# Patient Record
Sex: FEMALE | Race: BLACK OR AFRICAN AMERICAN | Employment: UNEMPLOYED | URBAN - METROPOLITAN AREA
[De-identification: names, ages, dates, MRNs, and addresses within clinical notes are randomized per-mention and may not be internally consistent; named-entity substitution may affect disease eponyms.]

---

## 2019-07-02 ENCOUNTER — HOSPITAL ENCOUNTER (OUTPATIENT)
Age: 49
Discharge: HOME | End: 2019-07-04
Payer: MEDICAID

## 2019-07-02 ENCOUNTER — OFFICE VISIT (OUTPATIENT)
Dept: FAMILY MEDICINE CLINIC | Age: 49
End: 2019-07-02
Payer: MEDICAID

## 2019-07-02 VITALS
BODY MASS INDEX: 44.51 KG/M2 | HEIGHT: 60 IN | SYSTOLIC BLOOD PRESSURE: 144 MMHG | WEIGHT: 226.7 LBS | RESPIRATION RATE: 16 BRPM | OXYGEN SATURATION: 98 % | TEMPERATURE: 97.8 F | DIASTOLIC BLOOD PRESSURE: 90 MMHG | HEART RATE: 87 BPM

## 2019-07-02 DIAGNOSIS — I10 ESSENTIAL HYPERTENSION: ICD-10-CM

## 2019-07-02 DIAGNOSIS — B37.2 YEAST INFECTION OF THE SKIN: ICD-10-CM

## 2019-07-02 DIAGNOSIS — E78.5 HYPERLIPIDEMIA, UNSPECIFIED HYPERLIPIDEMIA TYPE: ICD-10-CM

## 2019-07-02 DIAGNOSIS — F17.200 TOBACCO DEPENDENCY: ICD-10-CM

## 2019-07-02 DIAGNOSIS — E11.9 TYPE 2 DIABETES MELLITUS WITHOUT COMPLICATION, WITHOUT LONG-TERM CURRENT USE OF INSULIN (HCC): Primary | ICD-10-CM

## 2019-07-02 DIAGNOSIS — L02.92 BOILS: ICD-10-CM

## 2019-07-02 DIAGNOSIS — M54.31 SCIATIC NERVE PAIN, RIGHT: ICD-10-CM

## 2019-07-02 DIAGNOSIS — Z13.31 POSITIVE DEPRESSION SCREENING: ICD-10-CM

## 2019-07-02 LAB
ALBUMIN SERPL-MCNC: 4.2 G/DL (ref 3.5–5.2)
ALP BLD-CCNC: 73 U/L (ref 35–104)
ALT SERPL-CCNC: 12 U/L (ref 0–32)
ANION GAP SERPL CALCULATED.3IONS-SCNC: 18 MMOL/L (ref 7–16)
AST SERPL-CCNC: 18 U/L (ref 0–31)
BASOPHILS ABSOLUTE: 0.04 E9/L (ref 0–0.2)
BASOPHILS RELATIVE PERCENT: 0.5 % (ref 0–2)
BILIRUB SERPL-MCNC: 0.4 MG/DL (ref 0–1.2)
BUN BLDV-MCNC: 6 MG/DL (ref 6–20)
CALCIUM SERPL-MCNC: 9.8 MG/DL (ref 8.6–10.2)
CHLORIDE BLD-SCNC: 97 MMOL/L (ref 98–107)
CHOLESTEROL, TOTAL: 186 MG/DL (ref 0–199)
CO2: 23 MMOL/L (ref 22–29)
CREAT SERPL-MCNC: 0.6 MG/DL (ref 0.5–1)
CREATININE URINE POCT: 100
EOSINOPHILS ABSOLUTE: 0.25 E9/L (ref 0.05–0.5)
EOSINOPHILS RELATIVE PERCENT: 3.1 % (ref 0–6)
GFR AFRICAN AMERICAN: >60
GFR NON-AFRICAN AMERICAN: >60 ML/MIN/1.73
GLUCOSE BLD-MCNC: 243 MG/DL (ref 74–99)
HBA1C MFR BLD: 8 %
HCT VFR BLD CALC: 40.7 % (ref 34–48)
HDLC SERPL-MCNC: 60 MG/DL
HEMOGLOBIN: 13.1 G/DL (ref 11.5–15.5)
IMMATURE GRANULOCYTES #: 0.03 E9/L
IMMATURE GRANULOCYTES %: 0.4 % (ref 0–5)
LDL CHOLESTEROL CALCULATED: 107 MG/DL (ref 0–99)
LYMPHOCYTES ABSOLUTE: 2.66 E9/L (ref 1.5–4)
LYMPHOCYTES RELATIVE PERCENT: 33.1 % (ref 20–42)
MCH RBC QN AUTO: 29.4 PG (ref 26–35)
MCHC RBC AUTO-ENTMCNC: 32.2 % (ref 32–34.5)
MCV RBC AUTO: 91.3 FL (ref 80–99.9)
MICROALBUMIN/CREAT 24H UR: 10 MG/G{CREAT}
MICROALBUMIN/CREAT UR-RTO: <30
MONOCYTES ABSOLUTE: 0.4 E9/L (ref 0.1–0.95)
MONOCYTES RELATIVE PERCENT: 5 % (ref 2–12)
NEUTROPHILS ABSOLUTE: 4.66 E9/L (ref 1.8–7.3)
NEUTROPHILS RELATIVE PERCENT: 57.9 % (ref 43–80)
PDW BLD-RTO: 13.1 FL (ref 11.5–15)
PLATELET # BLD: 292 E9/L (ref 130–450)
PMV BLD AUTO: 12.1 FL (ref 7–12)
POTASSIUM SERPL-SCNC: 4.7 MMOL/L (ref 3.5–5)
RBC # BLD: 4.46 E12/L (ref 3.5–5.5)
SODIUM BLD-SCNC: 138 MMOL/L (ref 132–146)
TOTAL PROTEIN: 8.4 G/DL (ref 6.4–8.3)
TRIGL SERPL-MCNC: 97 MG/DL (ref 0–149)
TSH SERPL DL<=0.05 MIU/L-ACNC: 1.03 UIU/ML (ref 0.27–4.2)
VLDLC SERPL CALC-MCNC: 19 MG/DL
WBC # BLD: 8 E9/L (ref 4.5–11.5)

## 2019-07-02 PROCEDURE — 2022F DILAT RTA XM EVC RTNOPTHY: CPT | Performed by: FAMILY MEDICINE

## 2019-07-02 PROCEDURE — 83036 HEMOGLOBIN GLYCOSYLATED A1C: CPT | Performed by: FAMILY MEDICINE

## 2019-07-02 PROCEDURE — 85025 COMPLETE CBC W/AUTO DIFF WBC: CPT

## 2019-07-02 PROCEDURE — G8417 CALC BMI ABV UP PARAM F/U: HCPCS | Performed by: FAMILY MEDICINE

## 2019-07-02 PROCEDURE — 80061 LIPID PANEL: CPT

## 2019-07-02 PROCEDURE — 80053 COMPREHEN METABOLIC PANEL: CPT

## 2019-07-02 PROCEDURE — 3045F PR MOST RECENT HEMOGLOBIN A1C LEVEL 7.0-9.0%: CPT | Performed by: FAMILY MEDICINE

## 2019-07-02 PROCEDURE — 84443 ASSAY THYROID STIM HORMONE: CPT

## 2019-07-02 PROCEDURE — 96160 PT-FOCUSED HLTH RISK ASSMT: CPT | Performed by: FAMILY MEDICINE

## 2019-07-02 PROCEDURE — 99204 OFFICE O/P NEW MOD 45 MIN: CPT | Performed by: FAMILY MEDICINE

## 2019-07-02 PROCEDURE — G8427 DOCREV CUR MEDS BY ELIG CLIN: HCPCS | Performed by: FAMILY MEDICINE

## 2019-07-02 PROCEDURE — 4004F PT TOBACCO SCREEN RCVD TLK: CPT | Performed by: FAMILY MEDICINE

## 2019-07-02 PROCEDURE — G8431 POS CLIN DEPRES SCRN F/U DOC: HCPCS | Performed by: FAMILY MEDICINE

## 2019-07-02 PROCEDURE — 82044 UR ALBUMIN SEMIQUANTITATIVE: CPT | Performed by: FAMILY MEDICINE

## 2019-07-02 RX ORDER — LANCETS
EACH MISCELLANEOUS
Qty: 100 EACH | Refills: 3 | Status: SHIPPED | OUTPATIENT
Start: 2019-07-02 | End: 2019-08-20 | Stop reason: SDUPTHER

## 2019-07-02 RX ORDER — SERTRALINE HYDROCHLORIDE 25 MG/1
25 TABLET, FILM COATED ORAL DAILY
Qty: 30 TABLET | Refills: 3 | Status: SHIPPED | OUTPATIENT
Start: 2019-07-02 | End: 2019-08-08 | Stop reason: SDUPTHER

## 2019-07-02 RX ORDER — TIZANIDINE 2 MG/1
2 TABLET ORAL 3 TIMES DAILY PRN
Qty: 30 TABLET | Refills: 0 | Status: SHIPPED | OUTPATIENT
Start: 2019-07-02 | End: 2019-08-08 | Stop reason: SDUPTHER

## 2019-07-02 RX ORDER — BLOOD-GLUCOSE METER
EACH MISCELLANEOUS
Qty: 1 KIT | Refills: 0 | Status: SHIPPED
Start: 2019-07-02 | End: 2020-06-29

## 2019-07-02 RX ORDER — NYSTATIN 100000 [USP'U]/G
POWDER TOPICAL 3 TIMES DAILY PRN
Qty: 1 BOTTLE | Refills: 3 | Status: SHIPPED | OUTPATIENT
Start: 2019-07-02 | End: 2020-02-04 | Stop reason: SDUPTHER

## 2019-07-02 RX ORDER — GABAPENTIN 300 MG/1
300 CAPSULE ORAL 3 TIMES DAILY
Refills: 0 | COMMUNITY
Start: 2019-06-06 | End: 2020-08-25 | Stop reason: SDUPTHER

## 2019-07-02 ASSESSMENT — PATIENT HEALTH QUESTIONNAIRE - PHQ9
2. FEELING DOWN, DEPRESSED OR HOPELESS: 3
6. FEELING BAD ABOUT YOURSELF - OR THAT YOU ARE A FAILURE OR HAVE LET YOURSELF OR YOUR FAMILY DOWN: 1
9. THOUGHTS THAT YOU WOULD BE BETTER OFF DEAD, OR OF HURTING YOURSELF: 0
7. TROUBLE CONCENTRATING ON THINGS, SUCH AS READING THE NEWSPAPER OR WATCHING TELEVISION: 2
3. TROUBLE FALLING OR STAYING ASLEEP: 2
5. POOR APPETITE OR OVEREATING: 2
4. FEELING TIRED OR HAVING LITTLE ENERGY: 3
10. IF YOU CHECKED OFF ANY PROBLEMS, HOW DIFFICULT HAVE THESE PROBLEMS MADE IT FOR YOU TO DO YOUR WORK, TAKE CARE OF THINGS AT HOME, OR GET ALONG WITH OTHER PEOPLE: 1
SUM OF ALL RESPONSES TO PHQ QUESTIONS 1-9: 16
8. MOVING OR SPEAKING SO SLOWLY THAT OTHER PEOPLE COULD HAVE NOTICED. OR THE OPPOSITE, BEING SO FIGETY OR RESTLESS THAT YOU HAVE BEEN MOVING AROUND A LOT MORE THAN USUAL: 0
1. LITTLE INTEREST OR PLEASURE IN DOING THINGS: 3
SUM OF ALL RESPONSES TO PHQ9 QUESTIONS 1 & 2: 6
SUM OF ALL RESPONSES TO PHQ QUESTIONS 1-9: 16

## 2019-07-02 ASSESSMENT — ENCOUNTER SYMPTOMS
DIARRHEA: 0
SORE THROAT: 0
COUGH: 0
EYE PAIN: 0
BACK PAIN: 0
SHORTNESS OF BREATH: 0
SINUS PRESSURE: 0
CONSTIPATION: 0
ABDOMINAL PAIN: 0

## 2019-07-12 ENCOUNTER — TELEPHONE (OUTPATIENT)
Dept: FAMILY MEDICINE CLINIC | Age: 49
End: 2019-07-12

## 2019-07-12 RX ORDER — BLOOD-GLUCOSE METER
EACH MISCELLANEOUS
Qty: 1 KIT | Refills: 0 | Status: SHIPPED
Start: 2019-07-12 | End: 2020-06-29

## 2019-08-08 ENCOUNTER — OFFICE VISIT (OUTPATIENT)
Dept: FAMILY MEDICINE CLINIC | Age: 49
End: 2019-08-08
Payer: MEDICAID

## 2019-08-08 VITALS
BODY MASS INDEX: 47.32 KG/M2 | WEIGHT: 241 LBS | TEMPERATURE: 98.8 F | RESPIRATION RATE: 20 BRPM | DIASTOLIC BLOOD PRESSURE: 87 MMHG | HEIGHT: 60 IN | SYSTOLIC BLOOD PRESSURE: 140 MMHG | HEART RATE: 90 BPM

## 2019-08-08 DIAGNOSIS — E78.5 HYPERLIPIDEMIA, UNSPECIFIED HYPERLIPIDEMIA TYPE: ICD-10-CM

## 2019-08-08 DIAGNOSIS — M54.31 SCIATIC NERVE PAIN, RIGHT: ICD-10-CM

## 2019-08-08 DIAGNOSIS — Z12.11 SCREENING FOR COLON CANCER: ICD-10-CM

## 2019-08-08 DIAGNOSIS — F17.200 TOBACCO DEPENDENCY: ICD-10-CM

## 2019-08-08 DIAGNOSIS — Z23 NEED FOR PROPHYLACTIC VACCINATION AGAINST STREPTOCOCCUS PNEUMONIAE (PNEUMOCOCCUS): ICD-10-CM

## 2019-08-08 DIAGNOSIS — Z12.31 ENCOUNTER FOR SCREENING MAMMOGRAM FOR BREAST CANCER: ICD-10-CM

## 2019-08-08 DIAGNOSIS — E11.9 TYPE 2 DIABETES MELLITUS WITHOUT COMPLICATION, WITHOUT LONG-TERM CURRENT USE OF INSULIN (HCC): Primary | ICD-10-CM

## 2019-08-08 DIAGNOSIS — I10 ESSENTIAL HYPERTENSION: ICD-10-CM

## 2019-08-08 PROCEDURE — 3017F COLORECTAL CA SCREEN DOC REV: CPT | Performed by: FAMILY MEDICINE

## 2019-08-08 PROCEDURE — 90471 IMMUNIZATION ADMIN: CPT | Performed by: FAMILY MEDICINE

## 2019-08-08 PROCEDURE — G8417 CALC BMI ABV UP PARAM F/U: HCPCS | Performed by: FAMILY MEDICINE

## 2019-08-08 PROCEDURE — G8427 DOCREV CUR MEDS BY ELIG CLIN: HCPCS | Performed by: FAMILY MEDICINE

## 2019-08-08 PROCEDURE — 90732 PPSV23 VACC 2 YRS+ SUBQ/IM: CPT | Performed by: FAMILY MEDICINE

## 2019-08-08 PROCEDURE — 3045F PR MOST RECENT HEMOGLOBIN A1C LEVEL 7.0-9.0%: CPT | Performed by: FAMILY MEDICINE

## 2019-08-08 PROCEDURE — 2022F DILAT RTA XM EVC RTNOPTHY: CPT | Performed by: FAMILY MEDICINE

## 2019-08-08 PROCEDURE — 99214 OFFICE O/P EST MOD 30 MIN: CPT | Performed by: FAMILY MEDICINE

## 2019-08-08 PROCEDURE — 4004F PT TOBACCO SCREEN RCVD TLK: CPT | Performed by: FAMILY MEDICINE

## 2019-08-08 RX ORDER — GABAPENTIN 300 MG/1
CAPSULE ORAL
Qty: 90 CAPSULE | Refills: 0 | Status: CANCELLED | OUTPATIENT
Start: 2019-08-08

## 2019-08-08 RX ORDER — TRIAMTERENE AND HYDROCHLOROTHIAZIDE 37.5; 25 MG/1; MG/1
1 TABLET ORAL DAILY
Qty: 30 TABLET | Refills: 2 | Status: SHIPPED | OUTPATIENT
Start: 2019-08-08 | End: 2019-10-25 | Stop reason: SDUPTHER

## 2019-08-08 RX ORDER — SERTRALINE HYDROCHLORIDE 25 MG/1
25 TABLET, FILM COATED ORAL DAILY
Qty: 30 TABLET | Refills: 3 | Status: SHIPPED | OUTPATIENT
Start: 2019-08-08 | End: 2020-02-04 | Stop reason: SDUPTHER

## 2019-08-08 RX ORDER — TIZANIDINE 2 MG/1
2 TABLET ORAL 3 TIMES DAILY PRN
Qty: 30 TABLET | Refills: 0 | Status: ON HOLD
Start: 2019-08-08 | End: 2020-03-09 | Stop reason: HOSPADM

## 2019-08-08 RX ORDER — FUROSEMIDE 20 MG/1
20 TABLET ORAL DAILY PRN
Qty: 30 TABLET | Refills: 5 | Status: SHIPPED | OUTPATIENT
Start: 2019-08-08 | End: 2020-01-16 | Stop reason: SDUPTHER

## 2019-08-08 NOTE — PROGRESS NOTES
Keagan Knutson  : 1970    Chief Complaint:     Chief Complaint   Patient presents with    Diabetes     1 month f/u    Edema     B/L legs and feet    Medication Refill     percocet       HPI  Keagan Knutson 47 y.o. presents for   Chief Complaint   Patient presents with    Diabetes     1 month f/u    Edema     B/L legs and feet    Medication Refill     percocet     Treatment Adherence:   Medication compliance:  compliant most of the time  Diet compliance:  compliant most of the time  Weight trend: stable  Current exercise: no regular exercise  Barriers: none    Diabetes Mellitus Type 2: Current symptoms/problems include none. Home blood sugar records: patient does not test  Any episodes of hypoglycemia? no  Eye exam current (within one year): yes  Tobacco history: She  reports that she has been smoking. She started smoking about 24 years ago. She has a 7.50 pack-year smoking history. She has never used smokeless tobacco.   Daily Aspirin? Yes    Hypertension:  Home blood pressure monitoring: No.  She is adherent to a low sodium diet. Patient denies chest pain, shortness of breath, headache, lightheadedness, blurred vision, peripheral edema and palpitations. Antihypertensive medication side effects: no medication side effects noted. Use of agents associated with hypertension: none. Hyperlipidemia:  No new myalgias or GI upset on atorvastatin (Lipitor). Lab Results   Component Value Date    LABA1C 8.0 2019     Lab Results   Component Value Date    CREATININE 0.6 2019     Lab Results   Component Value Date    ALT 12 2019    AST 18 2019     Lab Results   Component Value Date    CHOL 186 2019    TRIG 97 2019    HDL 60 2019    LDLCALC 107 (H) 2019          All questions were answered to patients satisfaction.     Past Medical History:   Diagnosis Date    Arthritis     Blood transfusion     Bronchitis     Depression     tablet by mouth daily 30 tablet 2    furosemide (LASIX) 20 MG tablet Take 1 tablet by mouth daily as needed (swelling) 30 tablet 5    Blood Glucose Monitoring Suppl (ONE TOUCH ULTRA 2) w/Device KIT Use to check sugar daily. DM2 no insulin. Brand per insurance coverage for all test supplies 1 kit 0    blood glucose test strips (ASCENSIA AUTODISC VI;ONE TOUCH ULTRA TEST VI) strip Check blood sugar daily. E11.9. Brand per insurance 100 strip 1    gabapentin (NEURONTIN) 300 MG capsule TAKE ONE CAPSULE BY MOUTH EVERY 8 HOURS  0    Blood Glucose Monitoring Suppl (ACCU-CHEK JUNG PLUS) w/Device KIT Use 3x daily 1 kit 0    ACCU-CHEK MULTICLIX LANCETS MISC Use daily 100 each 3    nystatin (MYCOSTATIN) 084034 UNIT/GM powder Apply topically 3 times daily as needed (rash) 1 Bottle 3    tramadol (ULTRAM) 50 MG tablet Take 50 mg by mouth every 6 hours as needed.  oxycodone-acetaminophen (PERCOCET)  MG per tablet Take 1 tablet by mouth every 6 hours as needed. No current facility-administered medications for this visit. Allergies   Allergen Reactions    Motrin [Ibuprofen Micronized] Swelling       Health Maintenance Due   Topic Date Due    Diabetic retinal exam  08/03/1979    HIV screen  08/03/1984    Hepatitis B Vaccine (1 of 3 - Risk 3-dose series) 08/03/1988    DTaP/Tdap/Td vaccine (1 - Tdap) 08/03/1988    Cervical cancer screen  08/03/1990    Shingles Vaccine (1 of 2) 08/03/2019    Breast cancer screen  08/03/2019    Colon cancer screen colonoscopy  08/03/2019           REVIEW OF SYSTEMS  Review of Systems   Constitutional: Negative for fatigue and fever. HENT: Negative for ear pain, sinus pressure, sneezing and sore throat. Eyes: Negative for pain. Respiratory: Negative for cough and shortness of breath. Cardiovascular: Positive for leg swelling. Negative for chest pain. Gastrointestinal: Negative for abdominal pain, constipation and diarrhea.    Genitourinary: Negative 8.4 07/02/2019    LABALBU 4.2 07/02/2019    LABALBU 3.8 03/15/2012    CALCIUM 9.8 07/02/2019    GFRAA >60 07/02/2019    LABGLOM >60 07/02/2019    GLUCOSE 243 07/02/2019    GLUCOSE 124 03/15/2012    AST 18 07/02/2019    ALT 12 07/02/2019    ALKPHOS 73 07/02/2019    BILITOT 0.4 07/02/2019    TSH 1.030 07/02/2019    CHOL 186 07/02/2019    TRIG 97 07/02/2019    HDL 60 07/02/2019    LDLCALC 107 07/02/2019    LABA1C 8.0 07/02/2019        Lab Results   Component Value Date    CHOL 186 07/02/2019    CHOL 159 01/06/2012     Lab Results   Component Value Date    TRIG 97 07/02/2019    TRIG 51 01/06/2012     Lab Results   Component Value Date    HDL 60 07/02/2019    HDL 61.0 01/06/2012     Lab Results   Component Value Date    LDLCALC 107 (H) 07/02/2019    LDLCALC 88 01/06/2012       Lab Results   Component Value Date    LABA1C 8.0 07/02/2019     Lab Results   Component Value Date    LDLCALC 107 (H) 07/02/2019    CREATININE 0.6 07/02/2019       ASSESSMENT/PLAN:     Diagnosis Orders   1. Type 2 diabetes mellitus without complication, without long-term current use of insulin (HCC)   DIABETES FOOT EXAM   2. Essential hypertension     3. Sciatic nerve pain, right  tiZANidine (ZANAFLEX) 2 MG tablet   4. Tobacco dependency     5. Hyperlipidemia, unspecified hyperlipidemia type     6. Encounter for screening mammogram for breast cancer  AGUEDA Digital Screen Bilateral [[id number]]   7. Screening for colon cancer  Cologuard (For External Results Only)   8. Need for prophylactic vaccination against Streptococcus pneumoniae (pneumococcus)  Pneumococcal polysaccharide vaccine 23-valent PPSV23     Foot exam was within normal limits today. We will continue all medications at this time. Discussed adding statin to her regimen today and she is willing. BP elevated she will return in 2 weeks for BP check. Patient was counseled on smoking cessation and will call if he/she wishes to quit. Will add lasix due to leg swelling. May take as needed.  Rash

## 2019-08-09 RX ORDER — ATORVASTATIN CALCIUM 40 MG/1
40 TABLET, FILM COATED ORAL DAILY
Qty: 30 TABLET | Refills: 5 | Status: SHIPPED | OUTPATIENT
Start: 2019-08-09 | End: 2019-12-16 | Stop reason: SDUPTHER

## 2019-08-09 ASSESSMENT — ENCOUNTER SYMPTOMS
EYE PAIN: 0
COUGH: 0
CONSTIPATION: 0
DIARRHEA: 0
BACK PAIN: 0
SHORTNESS OF BREATH: 0
ABDOMINAL PAIN: 0
SORE THROAT: 0
SINUS PRESSURE: 0

## 2019-08-19 DIAGNOSIS — E11.9 TYPE 2 DIABETES MELLITUS WITHOUT COMPLICATION, WITHOUT LONG-TERM CURRENT USE OF INSULIN (HCC): ICD-10-CM

## 2019-08-20 RX ORDER — LANCETS
EACH MISCELLANEOUS
Qty: 100 EACH | Refills: 3 | Status: SHIPPED | OUTPATIENT
Start: 2019-08-20 | End: 2020-02-04 | Stop reason: SDUPTHER

## 2019-10-25 RX ORDER — TRIAMTERENE AND HYDROCHLOROTHIAZIDE 37.5; 25 MG/1; MG/1
1 TABLET ORAL DAILY
Qty: 30 TABLET | Refills: 2 | Status: SHIPPED | OUTPATIENT
Start: 2019-10-25 | End: 2020-01-16 | Stop reason: SDUPTHER

## 2019-12-16 RX ORDER — ATORVASTATIN CALCIUM 40 MG/1
40 TABLET, FILM COATED ORAL DAILY
Qty: 30 TABLET | Refills: 5 | Status: SHIPPED | OUTPATIENT
Start: 2019-12-16 | End: 2020-02-04 | Stop reason: SDUPTHER

## 2020-01-16 RX ORDER — TRIAMTERENE AND HYDROCHLOROTHIAZIDE 37.5; 25 MG/1; MG/1
1 TABLET ORAL DAILY
Qty: 30 TABLET | Refills: 2 | Status: SHIPPED | OUTPATIENT
Start: 2020-01-16 | End: 2020-02-04 | Stop reason: SDUPTHER

## 2020-01-16 RX ORDER — FUROSEMIDE 20 MG/1
20 TABLET ORAL DAILY PRN
Qty: 30 TABLET | Refills: 5 | Status: SHIPPED | OUTPATIENT
Start: 2020-01-16 | End: 2020-02-04 | Stop reason: SDUPTHER

## 2020-02-04 ENCOUNTER — OFFICE VISIT (OUTPATIENT)
Dept: FAMILY MEDICINE CLINIC | Age: 50
End: 2020-02-04
Payer: MEDICAID

## 2020-02-04 VITALS
HEART RATE: 93 BPM | WEIGHT: 231.2 LBS | SYSTOLIC BLOOD PRESSURE: 134 MMHG | HEIGHT: 60 IN | BODY MASS INDEX: 45.39 KG/M2 | OXYGEN SATURATION: 98 % | RESPIRATION RATE: 16 BRPM | TEMPERATURE: 98.7 F | DIASTOLIC BLOOD PRESSURE: 80 MMHG

## 2020-02-04 LAB — HBA1C MFR BLD: 7.8 %

## 2020-02-04 PROCEDURE — G8427 DOCREV CUR MEDS BY ELIG CLIN: HCPCS | Performed by: FAMILY MEDICINE

## 2020-02-04 PROCEDURE — 3046F HEMOGLOBIN A1C LEVEL >9.0%: CPT | Performed by: FAMILY MEDICINE

## 2020-02-04 PROCEDURE — G8484 FLU IMMUNIZE NO ADMIN: HCPCS | Performed by: FAMILY MEDICINE

## 2020-02-04 PROCEDURE — 4004F PT TOBACCO SCREEN RCVD TLK: CPT | Performed by: FAMILY MEDICINE

## 2020-02-04 PROCEDURE — 3017F COLORECTAL CA SCREEN DOC REV: CPT | Performed by: FAMILY MEDICINE

## 2020-02-04 PROCEDURE — 99214 OFFICE O/P EST MOD 30 MIN: CPT | Performed by: FAMILY MEDICINE

## 2020-02-04 PROCEDURE — 2022F DILAT RTA XM EVC RTNOPTHY: CPT | Performed by: FAMILY MEDICINE

## 2020-02-04 PROCEDURE — 83036 HEMOGLOBIN GLYCOSYLATED A1C: CPT | Performed by: FAMILY MEDICINE

## 2020-02-04 PROCEDURE — G8417 CALC BMI ABV UP PARAM F/U: HCPCS | Performed by: FAMILY MEDICINE

## 2020-02-04 PROCEDURE — 3052F HG A1C>EQUAL 8.0%<EQUAL 9.0%: CPT | Performed by: FAMILY MEDICINE

## 2020-02-04 RX ORDER — FUROSEMIDE 20 MG/1
20 TABLET ORAL DAILY PRN
Qty: 30 TABLET | Refills: 5 | Status: SHIPPED
Start: 2020-02-04 | End: 2021-02-02 | Stop reason: SDUPTHER

## 2020-02-04 RX ORDER — LANCETS
EACH MISCELLANEOUS
Qty: 100 EACH | Refills: 3 | Status: SHIPPED
Start: 2020-02-04 | End: 2020-05-28 | Stop reason: SDUPTHER

## 2020-02-04 RX ORDER — CEFDINIR 300 MG/1
300 CAPSULE ORAL 2 TIMES DAILY
Qty: 14 CAPSULE | Refills: 0 | Status: SHIPPED | OUTPATIENT
Start: 2020-02-04 | End: 2020-02-11

## 2020-02-04 RX ORDER — TRIAMTERENE AND HYDROCHLOROTHIAZIDE 37.5; 25 MG/1; MG/1
1 TABLET ORAL DAILY
Qty: 30 TABLET | Refills: 2 | Status: SHIPPED
Start: 2020-02-04 | End: 2020-04-17 | Stop reason: SDUPTHER

## 2020-02-04 RX ORDER — TIZANIDINE 2 MG/1
2 TABLET ORAL 3 TIMES DAILY PRN
Qty: 30 TABLET | Refills: 0 | Status: CANCELLED | OUTPATIENT
Start: 2020-02-04

## 2020-02-04 RX ORDER — NYSTATIN 100000 [USP'U]/G
POWDER TOPICAL 3 TIMES DAILY PRN
Qty: 1 BOTTLE | Refills: 3 | Status: SHIPPED
Start: 2020-02-04 | End: 2020-06-29

## 2020-02-04 RX ORDER — KETOCONAZOLE 20 MG/ML
SHAMPOO TOPICAL
Qty: 1 BOTTLE | Refills: 3 | Status: SHIPPED
Start: 2020-02-04 | End: 2020-05-13 | Stop reason: SDUPTHER

## 2020-02-04 RX ORDER — ATORVASTATIN CALCIUM 40 MG/1
40 TABLET, FILM COATED ORAL DAILY
Qty: 30 TABLET | Refills: 5 | Status: SHIPPED
Start: 2020-02-04 | End: 2020-12-30

## 2020-02-04 ASSESSMENT — ENCOUNTER SYMPTOMS
SHORTNESS OF BREATH: 0
DIARRHEA: 0
SORE THROAT: 1
SINUS PRESSURE: 1
COUGH: 1
ABDOMINAL PAIN: 0
CONSTIPATION: 0
EYE PAIN: 0
BACK PAIN: 0

## 2020-02-04 ASSESSMENT — PATIENT HEALTH QUESTIONNAIRE - PHQ9
SUM OF ALL RESPONSES TO PHQ QUESTIONS 1-9: 2
1. LITTLE INTEREST OR PLEASURE IN DOING THINGS: 1
SUM OF ALL RESPONSES TO PHQ QUESTIONS 1-9: 2
2. FEELING DOWN, DEPRESSED OR HOPELESS: 1
SUM OF ALL RESPONSES TO PHQ9 QUESTIONS 1 & 2: 2

## 2020-02-04 NOTE — PROGRESS NOTES
Darius Elkins  : 1970    Chief Complaint:     Chief Complaint   Patient presents with    Congestion     had flu like cold lasted 4 days ribs and back still hurt from coughing, sneezing yellow mucus     Discuss Medications     back medicine makes her sleep alot    Spots and/or Floaters     discoloration of spots around her body        HPI  Darius Elkins 47 y.o. presents for   Chief Complaint   Patient presents with    Congestion     had flu like cold lasted 4 days ribs and back still hurt from coughing, sneezing yellow mucus     Discuss Medications     back medicine makes her sleep alot    Spots and/or Floaters     discoloration of spots around her body      Treatment Adherence:   Medication compliance:  compliant most of the time  Diet compliance:  compliant most of the time  Weight trend: stable  Current exercise: no regular exercise  Barriers: none     Diabetes Mellitus Type 2: Current symptoms/problems include none.     Home blood sugar records: patient does not test  Any episodes of hypoglycemia? no  Eye exam current (within one year): yes  Tobacco history: She  reports that she has been smoking. She started smoking about 24 years ago. She has a 7.50 pack-year smoking history. She has never used smokeless tobacco.   Daily Aspirin? Yes     Hypertension:  Home blood pressure monitoring: No.  She is adherent to a low sodium diet. Patient denies chest pain, shortness of breath, headache, lightheadedness, blurred vision, peripheral edema and palpitations. Antihypertensive medication side effects: no medication side effects noted. Use of agents associated with hypertension: none.      Hyperlipidemia:  No new myalgias or GI upset on atorvastatin (Lipitor). Pandora Crigler admits to congestion and cough for the past 7 days. She denies any fevers or chills. She denies any shortness of breath. She has tried over-the-counter therapy with little relief.     Has noted a rash on her bilateral arms and legs. It is a slight discoloration of her skin. It is circular. This does not itch. There are had this before. No new soaps, detergents, clothes. Mood has been so-so. She denies any suicidal homicidal thoughts. She does wish to go up on the Zoloft slightly. All questions were answered to patients satisfaction. Past Medical History:   Diagnosis Date    Arthritis     Blood transfusion     Bronchitis     Depression     Hypertension     Pneumonia        Past Surgical History:   Procedure Laterality Date    APPENDECTOMY      CHOLECYSTECTOMY      FOOT SURGERY  2/20/12    julia \"toes\"    TUBAL LIGATION  1991       Social History     Socioeconomic History    Marital status: Single     Spouse name: None    Number of children: None    Years of education: None    Highest education level: None   Occupational History    None   Social Needs    Financial resource strain: None    Food insecurity:     Worry: None     Inability: None    Transportation needs:     Medical: None     Non-medical: None   Tobacco Use    Smoking status: Current Every Day Smoker     Packs/day: 0.75     Years: 10.00     Pack years: 7.50     Start date: 7/2/1995    Smokeless tobacco: Never Used   Substance and Sexual Activity    Alcohol use: No    Drug use: No    Sexual activity: None   Lifestyle    Physical activity:     Days per week: None     Minutes per session: None    Stress: None   Relationships    Social connections:     Talks on phone: None     Gets together: None     Attends Rastafari service: None     Active member of club or organization: None     Attends meetings of clubs or organizations: None     Relationship status: None    Intimate partner violence:     Fear of current or ex partner: None     Emotionally abused: None     Physically abused: None     Forced sexual activity: None   Other Topics Concern    None   Social History Narrative    None       History reviewed. No pertinent family history. Current Outpatient Medications   Medication Sig Dispense Refill    Accu-Chek Multiclix Lancets MISC Use daily 100 each 3    atorvastatin (LIPITOR) 40 MG tablet Take 1 tablet by mouth daily 30 tablet 5    blood glucose test strips (ASCENSIA AUTODISC VI;ONE TOUCH ULTRA TEST VI) strip Check blood sugar daily. E11.9. Brand per insurance 100 strip 1    furosemide (LASIX) 20 MG tablet Take 1 tablet by mouth daily as needed (swelling) 30 tablet 5    metFORMIN (GLUCOPHAGE) 850 MG tablet Take 1 tablet by mouth 2 times daily (with meals) 60 tablet 2    nystatin (MYCOSTATIN) 617076 UNIT/GM powder Apply topically 3 times daily as needed (rash) 1 Bottle 3    sertraline (ZOLOFT) 50 MG tablet Take 1 tablet by mouth daily 30 tablet 3    triamterene-hydrochlorothiazide (MAXZIDE-25) 37.5-25 MG per tablet Take 1 tablet by mouth daily 30 tablet 2    ketoconazole (NIZORAL) 2 % shampoo Apply 5 ml to scalp daily for 1 week, then 2-3x/week. 1 Bottle 3    cefdinir (OMNICEF) 300 MG capsule Take 1 capsule by mouth 2 times daily for 7 days 14 capsule 0    tiZANidine (ZANAFLEX) 2 MG tablet Take 1 tablet by mouth 3 times daily as needed (lumbar back pain) 30 tablet 0    Blood Glucose Monitoring Suppl (ONE TOUCH ULTRA 2) w/Device KIT Use to check sugar daily. DM2 no insulin. Brand per insurance coverage for all test supplies 1 kit 0    gabapentin (NEURONTIN) 300 MG capsule TAKE ONE CAPSULE BY MOUTH EVERY 8 HOURS  0    Blood Glucose Monitoring Suppl (ACCU-CHEK JUNG PLUS) w/Device KIT Use 3x daily 1 kit 0    tramadol (ULTRAM) 50 MG tablet Take 50 mg by mouth every 6 hours as needed.  oxycodone-acetaminophen (PERCOCET)  MG per tablet Take 1 tablet by mouth every 6 hours as needed. No current facility-administered medications for this visit.         Allergies   Allergen Reactions    Motrin [Ibuprofen Micronized] Swelling       Health Maintenance Due   Topic Date Due    Diabetic retinal exam  08/03/1979    LABA1C 8.0 07/02/2019     Lab Results   Component Value Date    LDLCALC 107 (H) 07/02/2019    CREATININE 0.6 07/02/2019       ASSESSMENT/PLAN:     Diagnosis Orders   1. Type 2 diabetes mellitus without complication, without long-term current use of insulin (HCC)  Accu-Chek Multiclix Lancets MISC   2. Hyperglycemia  POCT glycosylated hemoglobin (Hb A1C)   3. Sciatic nerve pain, right     4. Essential hypertension     5. Tobacco dependency     6. Hyperlipidemia, unspecified hyperlipidemia type     7. Mild episode of recurrent major depressive disorder ([address]     8. Tinea versicolor     9. Acute bacterial sinusitis       BP is well controlled today continue current therapy. A1c is decreased at 7.8. We will continue to monitor. She will continue to watch her diet. Smoking cessation discussed today. Continue statin as she is doing well on this. Will increase Zoloft to 50 mg. Side effects medication reviewed with patient. Likely she does have tinea versicolor will treat with ketoconazole. If this does not help then recommend return in 2 weeks for possible biopsy of these lesions. Patient is agreeable. Also treat for sinusitis with Omnicef. Side effects medication reviewed patient. Otherwise she will talk with her foot doctor about increasing dose of gabapentin due to back pain. Will discontinue Zanaflex as she gets too fatigued with this. Patient is agreeable. Problem list reviewed andsimplified/updated  HM reviewed today and counseled as appropriate    Call or go to ED immediately if symptoms worsen or persist.  Future Appointments   Date Time Provider [place]   8/6/2020  2:30 PM DO Jorge Luis Akers [facility]     Or sooner if necessary.       Educational materials and/or homeexercises printed for patient's review and were included in patient instructions on his/her After Visit Summary and given to patient at the end of visit.       Counseled regarding above diagnosis, including possible risks and complications,  especially if left uncontrolled.     Counseled regarding the possible side effects, risks, benefits and alternatives to treatment; patient and/or guardian verbalizes understanding, agrees,feels comfortable with and wishes to proceed with above treatment plan.     Advised patient to call Lizeth Pantoja new medication issues, and read all Rx info from pharmacy to assure aware of all possible risks and side effects of medication before taking.     Reviewed age and gender appropriate health screening exams and vaccinations. Advised patient regarding importance of keeping up with recommended health maintenance and toschedule as soon as possible if overdue, as this is important in assessing for undiagnosed pathology, especially cancer, as well as protecting against potentially harmful/life threatening disease.          Patient and/or guardian verbalizes understanding and agrees with above counseling, assessment and plan.     All questions answered. Jing Justin, DO  2/4/20    NOTE: This report was transcribed using voice recognition software.  Every effort was made to ensure accuracy; however, inadvertent computerized transcription errors may be present

## 2020-02-12 ENCOUNTER — TELEPHONE (OUTPATIENT)
Dept: FAMILY MEDICINE CLINIC | Age: 50
End: 2020-02-12

## 2020-02-13 RX ORDER — GUAIFENESIN AND CODEINE PHOSPHATE 100; 10 MG/5ML; MG/5ML
5 SOLUTION ORAL EVERY 4 HOURS PRN
Qty: 50 ML | Refills: 0 | Status: SHIPPED
Start: 2020-02-13 | End: 2020-02-18 | Stop reason: SDUPTHER

## 2020-02-17 ENCOUNTER — TELEPHONE (OUTPATIENT)
Dept: FAMILY MEDICINE CLINIC | Age: 50
End: 2020-02-17

## 2020-02-18 RX ORDER — GUAIFENESIN AND CODEINE PHOSPHATE 100; 10 MG/5ML; MG/5ML
5 SOLUTION ORAL EVERY 4 HOURS PRN
Qty: 50 ML | Refills: 0 | Status: SHIPPED | OUTPATIENT
Start: 2020-02-18 | End: 2020-02-21

## 2020-03-02 ENCOUNTER — TELEPHONE (OUTPATIENT)
Dept: FAMILY MEDICINE CLINIC | Age: 50
End: 2020-03-02

## 2020-03-03 ENCOUNTER — HOSPITAL ENCOUNTER (INPATIENT)
Age: 50
LOS: 5 days | Discharge: HOME | DRG: 812 | End: 2020-03-09
Attending: EMERGENCY MEDICINE | Admitting: INTERNAL MEDICINE
Payer: MEDICAID

## 2020-03-03 PROCEDURE — 6360000002 HC RX W HCPCS

## 2020-03-03 PROCEDURE — 99285 EMERGENCY DEPT VISIT HI MDM: CPT

## 2020-03-03 PROCEDURE — 96374 THER/PROPH/DIAG INJ IV PUSH: CPT

## 2020-03-03 PROCEDURE — 96375 TX/PRO/DX INJ NEW DRUG ADDON: CPT

## 2020-03-03 RX ORDER — NALOXONE HYDROCHLORIDE 0.4 MG/ML
0.4 INJECTION, SOLUTION INTRAMUSCULAR; INTRAVENOUS; SUBCUTANEOUS ONCE
Status: COMPLETED | OUTPATIENT
Start: 2020-03-03 | End: 2020-03-03

## 2020-03-03 RX ORDER — ONDANSETRON 2 MG/ML
4 INJECTION INTRAMUSCULAR; INTRAVENOUS ONCE
Status: COMPLETED | OUTPATIENT
Start: 2020-03-03 | End: 2020-03-03

## 2020-03-03 RX ORDER — NALOXONE HYDROCHLORIDE 0.4 MG/ML
INJECTION, SOLUTION INTRAMUSCULAR; INTRAVENOUS; SUBCUTANEOUS
Status: COMPLETED
Start: 2020-03-03 | End: 2020-03-03

## 2020-03-03 RX ORDER — ONDANSETRON 2 MG/ML
INJECTION INTRAMUSCULAR; INTRAVENOUS
Status: COMPLETED
Start: 2020-03-03 | End: 2020-03-03

## 2020-03-03 RX ADMIN — ONDANSETRON 4 MG: 2 INJECTION INTRAMUSCULAR; INTRAVENOUS at 22:08

## 2020-03-03 RX ADMIN — NALOXONE HYDROCHLORIDE 0.4 MG: 0.4 INJECTION, SOLUTION INTRAMUSCULAR; INTRAVENOUS; SUBCUTANEOUS at 22:09

## 2020-03-03 RX ADMIN — NALXONE HYDROCHLORIDE 0.4 MG: 0.4 INJECTION INTRAMUSCULAR; INTRAVENOUS; SUBCUTANEOUS at 22:09

## 2020-03-04 ENCOUNTER — APPOINTMENT (OUTPATIENT)
Dept: CT IMAGING | Age: 50
DRG: 812 | End: 2020-03-04
Payer: MEDICAID

## 2020-03-04 ENCOUNTER — APPOINTMENT (OUTPATIENT)
Dept: GENERAL RADIOLOGY | Age: 50
DRG: 812 | End: 2020-03-04
Payer: MEDICAID

## 2020-03-04 PROBLEM — J81.1 NONCARDIOGENIC PULMONARY EDEMA: Status: ACTIVE | Noted: 2020-03-04

## 2020-03-04 PROBLEM — J40 BRONCHITIS: Status: ACTIVE | Noted: 2020-03-04

## 2020-03-04 PROBLEM — E11.9 TYPE 2 DIABETES MELLITUS (HCC): Status: ACTIVE | Noted: 2020-03-04

## 2020-03-04 PROBLEM — J18.9 PNEUMONIA: Status: ACTIVE | Noted: 2020-03-04

## 2020-03-04 PROBLEM — Z72.0 TOBACCO ABUSE: Status: ACTIVE | Noted: 2020-03-04

## 2020-03-04 PROBLEM — Z87.898 HISTORY OF ALCOHOL USE: Status: ACTIVE | Noted: 2020-03-04

## 2020-03-04 PROBLEM — I10 HTN (HYPERTENSION): Chronic | Status: ACTIVE | Noted: 2020-03-04

## 2020-03-04 LAB
ADENOVIRUS BY PCR: NOT DETECTED
AMPHETAMINE SCREEN, URINE: NOT DETECTED
ANION GAP SERPL CALCULATED.3IONS-SCNC: 11 MMOL/L (ref 7–16)
B.E.: -3.1 MMOL/L (ref -3–3)
B.E.: -5.8 MMOL/L (ref -3–3)
BACTERIA: ABNORMAL /HPF
BARBITURATE SCREEN URINE: NOT DETECTED
BASOPHILS ABSOLUTE: 0.02 E9/L (ref 0–0.2)
BASOPHILS RELATIVE PERCENT: 0.2 % (ref 0–2)
BENZODIAZEPINE SCREEN, URINE: NOT DETECTED
BILIRUBIN URINE: NEGATIVE
BLOOD, URINE: NEGATIVE
BORDETELLA PARAPERTUSSIS BY PCR: NOT DETECTED
BORDETELLA PERTUSSIS BY PCR: NOT DETECTED
BUN BLDV-MCNC: 14 MG/DL (ref 6–20)
CALCIUM SERPL-MCNC: 8.6 MG/DL (ref 8.6–10.2)
CANNABINOID SCREEN URINE: NOT DETECTED
CHLAMYDOPHILIA PNEUMONIAE BY PCR: NOT DETECTED
CHLORIDE BLD-SCNC: 98 MMOL/L (ref 98–107)
CHP ED QC CHECK: YES
CLARITY: ABNORMAL
CO2: 23 MMOL/L (ref 22–29)
COCAINE METABOLITE SCREEN URINE: POSITIVE
COHB: 1.7 % (ref 0–1.5)
COHB: 2.7 % (ref 0–1.5)
COLOR: YELLOW
CORONAVIRUS 229E BY PCR: NOT DETECTED
CORONAVIRUS HKU1 BY PCR: NOT DETECTED
CORONAVIRUS NL63 BY PCR: NOT DETECTED
CORONAVIRUS OC43 BY PCR: NOT DETECTED
CREAT SERPL-MCNC: 0.6 MG/DL (ref 0.5–1)
CRITICAL: ABNORMAL
CRITICAL: ABNORMAL
DATE ANALYZED: ABNORMAL
DATE ANALYZED: ABNORMAL
DATE OF COLLECTION: ABNORMAL
DATE OF COLLECTION: ABNORMAL
EOSINOPHILS ABSOLUTE: 0 E9/L (ref 0.05–0.5)
EOSINOPHILS RELATIVE PERCENT: 0 % (ref 0–6)
EPITHELIAL CELLS, UA: ABNORMAL /HPF
FENTANYL SCREEN, URINE: POSITIVE
GFR AFRICAN AMERICAN: >60
GFR NON-AFRICAN AMERICAN: >60 ML/MIN/1.73
GLUCOSE BLD-MCNC: 287 MG/DL (ref 74–99)
GLUCOSE BLD-MCNC: 319 MG/DL
GLUCOSE URINE: 100 MG/DL
HCO3: 22 MMOL/L (ref 22–26)
HCO3: 23.1 MMOL/L (ref 22–26)
HCT VFR BLD CALC: 43.3 % (ref 34–48)
HEMOGLOBIN: 13.8 G/DL (ref 11.5–15.5)
HHB: 1.1 % (ref 0–5)
HHB: 13.3 % (ref 0–5)
HUMAN METAPNEUMOVIRUS BY PCR: NOT DETECTED
HUMAN RHINOVIRUS/ENTEROVIRUS BY PCR: NOT DETECTED
IMMATURE GRANULOCYTES #: 0.03 E9/L
IMMATURE GRANULOCYTES %: 0.3 % (ref 0–5)
INFLUENZA A BY PCR: NOT DETECTED
INFLUENZA A BY PCR: NOT DETECTED
INFLUENZA B BY PCR: NOT DETECTED
INFLUENZA B BY PCR: NOT DETECTED
KETONES, URINE: NEGATIVE MG/DL
LAB: ABNORMAL
LAB: ABNORMAL
LEUKOCYTE ESTERASE, URINE: ABNORMAL
LV EF: 55 %
LVEF MODALITY: NORMAL
LYMPHOCYTES ABSOLUTE: 0.71 E9/L (ref 1.5–4)
LYMPHOCYTES RELATIVE PERCENT: 7.8 % (ref 20–42)
Lab: ABNORMAL
MCH RBC QN AUTO: 29.4 PG (ref 26–35)
MCHC RBC AUTO-ENTMCNC: 31.9 % (ref 32–34.5)
MCV RBC AUTO: 92.1 FL (ref 80–99.9)
METER GLUCOSE: 188 MG/DL (ref 74–99)
METER GLUCOSE: 237 MG/DL (ref 74–99)
METER GLUCOSE: 292 MG/DL (ref 74–99)
METER GLUCOSE: 319 MG/DL (ref 74–99)
METHADONE SCREEN, URINE: NOT DETECTED
METHB: 0.3 % (ref 0–1.5)
METHB: 0.4 % (ref 0–1.5)
MODE: ABNORMAL
MODE: ABNORMAL
MONOCYTES ABSOLUTE: 0.22 E9/L (ref 0.1–0.95)
MONOCYTES RELATIVE PERCENT: 2.4 % (ref 2–12)
MYCOPLASMA PNEUMONIAE BY PCR: NOT DETECTED
NEUTROPHILS ABSOLUTE: 8.14 E9/L (ref 1.8–7.3)
NEUTROPHILS RELATIVE PERCENT: 89.3 % (ref 43–80)
NITRITE, URINE: NEGATIVE
O2 CONTENT: 17.1 ML/DL
O2 CONTENT: 18.9 ML/DL
O2 SATURATION: 86.3 % (ref 92–98.5)
O2 SATURATION: 98.9 % (ref 92–98.5)
O2HB: 83.6 % (ref 94–97)
O2HB: 96.9 % (ref 94–97)
OPERATOR ID: 1893
OPERATOR ID: 316
OPIATE SCREEN URINE: NOT DETECTED
OXYCODONE URINE: NOT DETECTED
PARAINFLUENZA VIRUS 1 BY PCR: NOT DETECTED
PARAINFLUENZA VIRUS 2 BY PCR: NOT DETECTED
PARAINFLUENZA VIRUS 3 BY PCR: NOT DETECTED
PARAINFLUENZA VIRUS 4 BY PCR: NOT DETECTED
PATIENT TEMP: 37 C
PATIENT TEMP: 37 C
PCO2: 45.9 MMHG (ref 35–45)
PCO2: 51.9 MMHG (ref 35–45)
PDW BLD-RTO: 13.4 FL (ref 11.5–15)
PH BLOOD GAS: 7.25 (ref 7.35–7.45)
PH BLOOD GAS: 7.32 (ref 7.35–7.45)
PH UA: 5 (ref 5–9)
PHENCYCLIDINE SCREEN URINE: NOT DETECTED
PLATELET # BLD: 218 E9/L (ref 130–450)
PMV BLD AUTO: 11.4 FL (ref 7–12)
PO2: 144.5 MMHG (ref 75–100)
PO2: 56.6 MMHG (ref 75–100)
POTASSIUM REFLEX MAGNESIUM: 5 MMOL/L (ref 3.5–5)
PRO-BNP: 44 PG/ML (ref 0–125)
PROCALCITONIN: 1.06 NG/ML (ref 0–0.08)
PROCALCITONIN: 2.15 NG/ML (ref 0–0.08)
PROTEIN UA: NEGATIVE MG/DL
RBC # BLD: 4.7 E12/L (ref 3.5–5.5)
RBC # BLD: NORMAL 10*6/UL
RBC UA: ABNORMAL /HPF (ref 0–2)
RESPIRATORY SYNCYTIAL VIRUS BY PCR: NOT DETECTED
SODIUM BLD-SCNC: 132 MMOL/L (ref 132–146)
SOURCE, BLOOD GAS: ABNORMAL
SOURCE, BLOOD GAS: ABNORMAL
SPECIFIC GRAVITY UA: >=1.03 (ref 1–1.03)
THB: 13.7 G/DL (ref 11.5–16.5)
THB: 14.6 G/DL (ref 11.5–16.5)
TIME ANALYZED: 1120
TIME ANALYZED: 435
TRICHOMONAS: PRESENT /HPF
TROPONIN: <0.01 NG/ML (ref 0–0.03)
UROBILINOGEN, URINE: 0.2 E.U./DL
WBC # BLD: 9.1 E9/L (ref 4.5–11.5)
WBC UA: >20 /HPF (ref 0–5)

## 2020-03-04 PROCEDURE — 71250 CT THORAX DX C-: CPT

## 2020-03-04 PROCEDURE — 82805 BLOOD GASES W/O2 SATURATION: CPT

## 2020-03-04 PROCEDURE — 6370000000 HC RX 637 (ALT 250 FOR IP): Performed by: EMERGENCY MEDICINE

## 2020-03-04 PROCEDURE — 6360000002 HC RX W HCPCS: Performed by: INTERNAL MEDICINE

## 2020-03-04 PROCEDURE — 83880 ASSAY OF NATRIURETIC PEPTIDE: CPT

## 2020-03-04 PROCEDURE — 2580000003 HC RX 258: Performed by: INTERNAL MEDICINE

## 2020-03-04 PROCEDURE — 2580000003 HC RX 258: Performed by: NURSE PRACTITIONER

## 2020-03-04 PROCEDURE — 80048 BASIC METABOLIC PNL TOTAL CA: CPT

## 2020-03-04 PROCEDURE — 93005 ELECTROCARDIOGRAM TRACING: CPT | Performed by: NURSE PRACTITIONER

## 2020-03-04 PROCEDURE — 93306 TTE W/DOPPLER COMPLETE: CPT

## 2020-03-04 PROCEDURE — 87040 BLOOD CULTURE FOR BACTERIA: CPT

## 2020-03-04 PROCEDURE — APPSS45 APP SPLIT SHARED TIME 31-45 MINUTES: Performed by: NURSE PRACTITIONER

## 2020-03-04 PROCEDURE — 6370000000 HC RX 637 (ALT 250 FOR IP): Performed by: NURSE PRACTITIONER

## 2020-03-04 PROCEDURE — 6360000002 HC RX W HCPCS: Performed by: NURSE PRACTITIONER

## 2020-03-04 PROCEDURE — 87088 URINE BACTERIA CULTURE: CPT

## 2020-03-04 PROCEDURE — 82962 GLUCOSE BLOOD TEST: CPT

## 2020-03-04 PROCEDURE — 80307 DRUG TEST PRSMV CHEM ANLYZR: CPT

## 2020-03-04 PROCEDURE — 0100U HC RESPIRPTHGN MULT REV TRANS & AMP PRB TECH 21 TRGT: CPT

## 2020-03-04 PROCEDURE — 84145 PROCALCITONIN (PCT): CPT

## 2020-03-04 PROCEDURE — 85025 COMPLETE CBC W/AUTO DIFF WBC: CPT

## 2020-03-04 PROCEDURE — 99223 1ST HOSP IP/OBS HIGH 75: CPT | Performed by: INTERNAL MEDICINE

## 2020-03-04 PROCEDURE — 81001 URINALYSIS AUTO W/SCOPE: CPT

## 2020-03-04 PROCEDURE — 2580000003 HC RX 258: Performed by: EMERGENCY MEDICINE

## 2020-03-04 PROCEDURE — 96375 TX/PRO/DX INJ NEW DRUG ADDON: CPT

## 2020-03-04 PROCEDURE — 84484 ASSAY OF TROPONIN QUANT: CPT

## 2020-03-04 PROCEDURE — 6360000002 HC RX W HCPCS: Performed by: EMERGENCY MEDICINE

## 2020-03-04 PROCEDURE — 71045 X-RAY EXAM CHEST 1 VIEW: CPT

## 2020-03-04 PROCEDURE — 36415 COLL VENOUS BLD VENIPUNCTURE: CPT

## 2020-03-04 PROCEDURE — 2060000000 HC ICU INTERMEDIATE R&B

## 2020-03-04 PROCEDURE — 87502 INFLUENZA DNA AMP PROBE: CPT

## 2020-03-04 RX ORDER — NICOTINE 21 MG/24HR
1 PATCH, TRANSDERMAL 24 HOURS TRANSDERMAL DAILY
Status: DISCONTINUED | OUTPATIENT
Start: 2020-03-04 | End: 2020-03-09 | Stop reason: HOSPADM

## 2020-03-04 RX ORDER — NICOTINE POLACRILEX 4 MG
15 LOZENGE BUCCAL PRN
Status: DISCONTINUED | OUTPATIENT
Start: 2020-03-04 | End: 2020-03-09 | Stop reason: HOSPADM

## 2020-03-04 RX ORDER — POLYETHYLENE GLYCOL 3350 17 G/17G
17 POWDER, FOR SOLUTION ORAL DAILY PRN
Status: DISCONTINUED | OUTPATIENT
Start: 2020-03-04 | End: 2020-03-09 | Stop reason: HOSPADM

## 2020-03-04 RX ORDER — FUROSEMIDE 10 MG/ML
40 INJECTION INTRAMUSCULAR; INTRAVENOUS ONCE
Status: COMPLETED | OUTPATIENT
Start: 2020-03-04 | End: 2020-03-04

## 2020-03-04 RX ORDER — GABAPENTIN 300 MG/1
300 CAPSULE ORAL 3 TIMES DAILY
Status: DISCONTINUED | OUTPATIENT
Start: 2020-03-04 | End: 2020-03-09 | Stop reason: HOSPADM

## 2020-03-04 RX ORDER — SODIUM CHLORIDE 0.9 % (FLUSH) 0.9 %
10 SYRINGE (ML) INJECTION PRN
Status: DISCONTINUED | OUTPATIENT
Start: 2020-03-04 | End: 2020-03-09 | Stop reason: HOSPADM

## 2020-03-04 RX ORDER — SODIUM CHLORIDE 0.9 % (FLUSH) 0.9 %
10 SYRINGE (ML) INJECTION EVERY 12 HOURS SCHEDULED
Status: DISCONTINUED | OUTPATIENT
Start: 2020-03-04 | End: 2020-03-09 | Stop reason: HOSPADM

## 2020-03-04 RX ORDER — LEVALBUTEROL 1.25 MG/.5ML
1.25 SOLUTION, CONCENTRATE RESPIRATORY (INHALATION) EVERY 4 HOURS PRN
Status: DISCONTINUED | OUTPATIENT
Start: 2020-03-04 | End: 2020-03-09 | Stop reason: HOSPADM

## 2020-03-04 RX ORDER — LEVOFLOXACIN 5 MG/ML
750 INJECTION, SOLUTION INTRAVENOUS EVERY 24 HOURS
Status: DISCONTINUED | OUTPATIENT
Start: 2020-03-05 | End: 2020-03-04

## 2020-03-04 RX ORDER — TIZANIDINE 4 MG/1
2 TABLET ORAL 3 TIMES DAILY PRN
Status: DISCONTINUED | OUTPATIENT
Start: 2020-03-04 | End: 2020-03-05

## 2020-03-04 RX ORDER — 0.9 % SODIUM CHLORIDE 0.9 %
1000 INTRAVENOUS SOLUTION INTRAVENOUS ONCE
Status: DISCONTINUED | OUTPATIENT
Start: 2020-03-04 | End: 2020-03-04

## 2020-03-04 RX ORDER — ACETAMINOPHEN 325 MG/1
650 TABLET ORAL EVERY 4 HOURS PRN
Status: DISCONTINUED | OUTPATIENT
Start: 2020-03-04 | End: 2020-03-04 | Stop reason: ALTCHOICE

## 2020-03-04 RX ORDER — ATORVASTATIN CALCIUM 40 MG/1
40 TABLET, FILM COATED ORAL DAILY
Status: DISCONTINUED | OUTPATIENT
Start: 2020-03-04 | End: 2020-03-09 | Stop reason: HOSPADM

## 2020-03-04 RX ORDER — ONDANSETRON 2 MG/ML
4 INJECTION INTRAMUSCULAR; INTRAVENOUS EVERY 6 HOURS PRN
Status: DISCONTINUED | OUTPATIENT
Start: 2020-03-04 | End: 2020-03-09 | Stop reason: HOSPADM

## 2020-03-04 RX ORDER — SODIUM CHLORIDE 0.9 % (FLUSH) 0.9 %
10 SYRINGE (ML) INJECTION EVERY 12 HOURS SCHEDULED
Status: DISCONTINUED | OUTPATIENT
Start: 2020-03-04 | End: 2020-03-04 | Stop reason: SDUPTHER

## 2020-03-04 RX ORDER — DEXTROSE MONOHYDRATE 25 G/50ML
12.5 INJECTION, SOLUTION INTRAVENOUS PRN
Status: DISCONTINUED | OUTPATIENT
Start: 2020-03-04 | End: 2020-03-09 | Stop reason: HOSPADM

## 2020-03-04 RX ORDER — TRAMADOL HYDROCHLORIDE 50 MG/1
50 TABLET ORAL EVERY 6 HOURS PRN
Status: DISCONTINUED | OUTPATIENT
Start: 2020-03-04 | End: 2020-03-05

## 2020-03-04 RX ORDER — IPRATROPIUM BROMIDE AND ALBUTEROL SULFATE 2.5; .5 MG/3ML; MG/3ML
1 SOLUTION RESPIRATORY (INHALATION)
Status: DISCONTINUED | OUTPATIENT
Start: 2020-03-04 | End: 2020-03-04

## 2020-03-04 RX ORDER — LORAZEPAM 2 MG/ML
1 INJECTION INTRAMUSCULAR EVERY 6 HOURS PRN
Status: DISCONTINUED | OUTPATIENT
Start: 2020-03-04 | End: 2020-03-05

## 2020-03-04 RX ORDER — SODIUM CHLORIDE FOR INHALATION 0.9 %
3 VIAL, NEBULIZER (ML) INHALATION PRN
Status: DISCONTINUED | OUTPATIENT
Start: 2020-03-04 | End: 2020-03-09 | Stop reason: HOSPADM

## 2020-03-04 RX ORDER — PROMETHAZINE HYDROCHLORIDE 25 MG/1
12.5 TABLET ORAL EVERY 6 HOURS PRN
Status: DISCONTINUED | OUTPATIENT
Start: 2020-03-04 | End: 2020-03-09 | Stop reason: HOSPADM

## 2020-03-04 RX ORDER — DEXTROSE MONOHYDRATE 50 MG/ML
100 INJECTION, SOLUTION INTRAVENOUS PRN
Status: DISCONTINUED | OUTPATIENT
Start: 2020-03-04 | End: 2020-03-09 | Stop reason: HOSPADM

## 2020-03-04 RX ORDER — ACETAMINOPHEN 325 MG/1
650 TABLET ORAL EVERY 6 HOURS PRN
Status: DISCONTINUED | OUTPATIENT
Start: 2020-03-04 | End: 2020-03-09 | Stop reason: HOSPADM

## 2020-03-04 RX ORDER — SODIUM CHLORIDE 0.9 % (FLUSH) 0.9 %
10 SYRINGE (ML) INJECTION PRN
Status: DISCONTINUED | OUTPATIENT
Start: 2020-03-04 | End: 2020-03-04 | Stop reason: SDUPTHER

## 2020-03-04 RX ORDER — SODIUM CHLORIDE 9 MG/ML
INJECTION, SOLUTION INTRAVENOUS CONTINUOUS
Status: DISCONTINUED | OUTPATIENT
Start: 2020-03-04 | End: 2020-03-06

## 2020-03-04 RX ORDER — NITROGLYCERIN 0.4 MG/1
0.4 TABLET SUBLINGUAL ONCE
Status: COMPLETED | OUTPATIENT
Start: 2020-03-04 | End: 2020-03-04

## 2020-03-04 RX ORDER — ACETAMINOPHEN 650 MG/1
650 SUPPOSITORY RECTAL EVERY 6 HOURS PRN
Status: DISCONTINUED | OUTPATIENT
Start: 2020-03-04 | End: 2020-03-09 | Stop reason: HOSPADM

## 2020-03-04 RX ORDER — TRIAMTERENE AND HYDROCHLOROTHIAZIDE 37.5; 25 MG/1; MG/1
1 TABLET ORAL DAILY
Status: DISCONTINUED | OUTPATIENT
Start: 2020-03-04 | End: 2020-03-09 | Stop reason: HOSPADM

## 2020-03-04 RX ORDER — METRONIDAZOLE 500 MG/1
500 TABLET ORAL EVERY 8 HOURS SCHEDULED
Status: DISCONTINUED | OUTPATIENT
Start: 2020-03-04 | End: 2020-03-05

## 2020-03-04 RX ADMIN — TRIAMTERENE AND HYDROCHLOROTHIAZIDE 1 TABLET: 37.5; 25 TABLET ORAL at 12:45

## 2020-03-04 RX ADMIN — SERTRALINE HYDROCHLORIDE 50 MG: 50 TABLET ORAL at 11:21

## 2020-03-04 RX ADMIN — INSULIN LISPRO 2 UNITS: 100 INJECTION, SOLUTION INTRAVENOUS; SUBCUTANEOUS at 16:59

## 2020-03-04 RX ADMIN — LEVOFLOXACIN 750 MG: 500 TABLET, FILM COATED ORAL at 06:13

## 2020-03-04 RX ADMIN — TRAMADOL HYDROCHLORIDE 50 MG: 50 TABLET, FILM COATED ORAL at 11:20

## 2020-03-04 RX ADMIN — LORAZEPAM 1 MG: 2 INJECTION INTRAMUSCULAR; INTRAVENOUS at 22:08

## 2020-03-04 RX ADMIN — SODIUM CHLORIDE 1000 ML: 9 INJECTION, SOLUTION INTRAVENOUS at 01:27

## 2020-03-04 RX ADMIN — SODIUM CHLORIDE, PRESERVATIVE FREE 10 ML: 5 INJECTION INTRAVENOUS at 21:30

## 2020-03-04 RX ADMIN — GABAPENTIN 300 MG: 300 CAPSULE ORAL at 11:21

## 2020-03-04 RX ADMIN — AMPICILLIN SODIUM AND SULBACTAM SODIUM 3 G: 2; 1 INJECTION, POWDER, FOR SOLUTION INTRAMUSCULAR; INTRAVENOUS at 12:45

## 2020-03-04 RX ADMIN — NITROGLYCERIN 0.4 MG: 0.4 TABLET, ORALLY DISINTEGRATING SUBLINGUAL at 04:35

## 2020-03-04 RX ADMIN — GABAPENTIN 300 MG: 300 CAPSULE ORAL at 14:38

## 2020-03-04 RX ADMIN — SODIUM CHLORIDE, PRESERVATIVE FREE 10 ML: 5 INJECTION INTRAVENOUS at 11:21

## 2020-03-04 RX ADMIN — TRAMADOL HYDROCHLORIDE 50 MG: 50 TABLET, FILM COATED ORAL at 18:19

## 2020-03-04 RX ADMIN — METRONIDAZOLE 500 MG: 500 TABLET, FILM COATED ORAL at 22:07

## 2020-03-04 RX ADMIN — AMPICILLIN SODIUM AND SULBACTAM SODIUM 3 G: 2; 1 INJECTION, POWDER, FOR SOLUTION INTRAMUSCULAR; INTRAVENOUS at 18:20

## 2020-03-04 RX ADMIN — GABAPENTIN 300 MG: 300 CAPSULE ORAL at 21:30

## 2020-03-04 RX ADMIN — FUROSEMIDE 40 MG: 10 INJECTION, SOLUTION INTRAMUSCULAR; INTRAVENOUS at 04:34

## 2020-03-04 RX ADMIN — ATORVASTATIN CALCIUM 40 MG: 40 TABLET, FILM COATED ORAL at 11:20

## 2020-03-04 RX ADMIN — INSULIN LISPRO 3 UNITS: 100 INJECTION, SOLUTION INTRAVENOUS; SUBCUTANEOUS at 22:11

## 2020-03-04 RX ADMIN — SODIUM CHLORIDE: 9 INJECTION, SOLUTION INTRAVENOUS at 11:22

## 2020-03-04 RX ADMIN — ENOXAPARIN SODIUM 40 MG: 40 INJECTION SUBCUTANEOUS at 11:21

## 2020-03-04 RX ADMIN — INSULIN LISPRO 4 UNITS: 100 INJECTION, SOLUTION INTRAVENOUS; SUBCUTANEOUS at 14:06

## 2020-03-04 ASSESSMENT — PAIN - FUNCTIONAL ASSESSMENT
PAIN_FUNCTIONAL_ASSESSMENT: ACTIVITIES ARE NOT PREVENTED
PAIN_FUNCTIONAL_ASSESSMENT: PREVENTS OR INTERFERES SOME ACTIVE ACTIVITIES AND ADLS

## 2020-03-04 ASSESSMENT — PAIN DESCRIPTION - DESCRIPTORS
DESCRIPTORS: ACHING;BURNING;SHOOTING
DESCRIPTORS: ACHING;SHOOTING;BURNING

## 2020-03-04 ASSESSMENT — ENCOUNTER SYMPTOMS
SHORTNESS OF BREATH: 1
STRIDOR: 0
NAUSEA: 0
ABDOMINAL PAIN: 0
SORE THROAT: 0
VOMITING: 0
COLOR CHANGE: 0

## 2020-03-04 ASSESSMENT — PAIN DESCRIPTION - LOCATION
LOCATION: BACK;LEG
LOCATION: BACK;LEG

## 2020-03-04 ASSESSMENT — PAIN DESCRIPTION - PAIN TYPE
TYPE: CHRONIC PAIN
TYPE: CHRONIC PAIN;ACUTE PAIN

## 2020-03-04 ASSESSMENT — PAIN SCALES - GENERAL
PAINLEVEL_OUTOF10: 0
PAINLEVEL_OUTOF10: 8
PAINLEVEL_OUTOF10: 9
PAINLEVEL_OUTOF10: 7

## 2020-03-04 ASSESSMENT — PAIN DESCRIPTION - ORIENTATION
ORIENTATION: LOWER;RIGHT
ORIENTATION: LOWER;RIGHT

## 2020-03-04 ASSESSMENT — PAIN DESCRIPTION - FREQUENCY
FREQUENCY: CONTINUOUS
FREQUENCY: CONTINUOUS

## 2020-03-04 ASSESSMENT — PAIN DESCRIPTION - PROGRESSION
CLINICAL_PROGRESSION: GRADUALLY WORSENING
CLINICAL_PROGRESSION: GRADUALLY WORSENING

## 2020-03-04 ASSESSMENT — PAIN DESCRIPTION - ONSET
ONSET: ON-GOING
ONSET: ON-GOING

## 2020-03-04 NOTE — ED NOTES
Faxed [facility] to floor, spoke with Shraddha Fair who received fax. Pt ready to go.      Sarah Watson RN  03/04/20 [id number]

## 2020-03-04 NOTE — ED NOTES
Pt given 0.4mg IV narcan and 0.4 mg nasal narcan before arrival by EMS.        Iris Roy RN  03/03/20 324 Món[address] DIDI Flores  03/03/20 [id number]

## 2020-03-04 NOTE — ED PROVIDER NOTES
Addendum: RN notified me that patient's O2 sat was 85% and heart rate was elevated around 125 and upon physical examination Rales noted throughout all fields especially bases therefore given Lasix and nitroglycerin x1 and chest x-ray showed what appears to be noncardiogenic pulmonary edema opposed to infiltrates  to pneumonia therefore Levaquin 750 p.o. given as well and blood cultures obtained.   Saturation 100% now with 2 L nasal cannula and patient to be admitted to hospitalist service      Critical care time of 40 minutes     Zeinab Chin MD  03/04/20 [id number]

## 2020-03-04 NOTE — ED PROVIDER NOTES
Fauzia Palomo is a 47 y.o. female presenting to the emergency department via EMS for Drug Overdose. Per EMS, they were called by family members for unresponsivenss. EMS found patient altered with agonal breathing and pinpoint pupils. Patient was given 2mg IN narcan with mild relief. This was followed by 2mg IV narcan with more alertness noted. On arrival patient is alert, yet appears fatigued. She admits to taking narcotic medication that was a neighbors. She admits to drinking alcohol this evening, denies any other ingestions. She denies suicidal or homicidal ideations. The history is provided by the patient. Illness    The current episode started today. The onset was sudden. The problem has been gradually improving. The problem is moderate. Relieved by: narcan. Nothing aggravates the symptoms. Pertinent negatives include no fever, no abdominal pain, no nausea, no vomiting, no congestion, no headaches, no sore throat, no stridor and no neck pain. Review of Systems   Constitutional: Positive for fatigue. Negative for chills and fever. HENT: Negative for congestion and sore throat. Eyes: Negative for visual disturbance. Respiratory: Positive for shortness of breath. Negative for stridor. Cardiovascular: Negative for chest pain. Gastrointestinal: Negative for abdominal pain, nausea and vomiting. Genitourinary: Negative for difficulty urinating. Musculoskeletal: Negative for neck pain. Skin: Negative for color change. Neurological: Negative for headaches. Psychiatric/Behavioral: Negative for confusion and suicidal ideas. Physical Exam  Vitals signs and nursing note reviewed. Constitutional:       General: She is not in acute distress. Appearance: She is obese. HENT:      Head: Normocephalic and atraumatic. Nose: Nose normal.      Mouth/Throat:      Mouth: Mucous membranes are moist.   Eyes:      General: No scleral icterus.      Conjunctiva/sclera: Conjunctivae normal.      Pupils: Pupils are equal, round, and reactive to light. Comments: 1mm pinpoint pupils   Neck:      Musculoskeletal: Normal range of motion and neck supple. Cardiovascular:      Rate and Rhythm: Regular rhythm. Tachycardia present. Pulses: Normal pulses. Heart sounds: No murmur. Pulmonary:      Effort: Tachypnea present. Breath sounds: No wheezing, rhonchi or rales. Abdominal:      General: Bowel sounds are normal.      Palpations: Abdomen is soft. Abdomen is not rigid. There is no pulsatile mass. Tenderness: There is no abdominal tenderness. There is no guarding or rebound. Musculoskeletal:      Right lower leg: No edema. Left lower leg: No edema. Comments: Moves all extremities x 4   Skin:     General: Skin is warm and dry. Capillary Refill: Capillary refill takes less than 2 seconds. Coloration: Skin is not jaundiced. Neurological:      General: No focal deficit present. Mental Status: She is oriented to person, place, and time and easily aroused. She is lethargic. GCS: GCS eye subscore is 4. GCS verbal subscore is 5. GCS motor subscore is 6. Cranial Nerves: Cranial nerves are intact. Psychiatric:         Speech: Speech normal.         Behavior: Behavior is slowed. Thought Content: Thought content does not include homicidal or suicidal ideation. Thought content does not include homicidal or suicidal plan. Procedures     MDM  Number of Diagnoses or Management Options  Drug overdose, undetermined intent, initial encounter:   Diagnosis management comments: Patient presents to the ED for Drug Overdose. Patient received 2mg IN and 2mg IV narcan with good relief. Patient given 1mg IV Narcan here in ED as patient became more lethargic with pinpoint pupils with good relief. Patient monitored in ED. She denied any SI/HI. Patient continues to be non-toxic on re-evaluation. Patient is hemodynamically stable. their ED course. DISPOSITION:  Disposition: Discharge to home. Patient condition is stable. END OF PROVIDER NOTE.        Brad Edmondson DO  03/04/20 [id number]

## 2020-03-04 NOTE — CARE COORDINATION
3/4/2020 Introduced myself to patient and described my role asa . Discussed inpatient status of her stay and plan of care for her dx pulmonary edema including nebulizers testing diuretics and consults. She affirmed understanding . She lives with her children in a one floor home 6 steps to enter. She is independent in mobility. She denies any DME/HHC/GT in the past She drives . She sees Dr Dumont as her PCP. She gets her medications from [facility] pharmacy> Infrome d her that a  and a  will follow her through her stay to assist in the transition of care /discharge plan. Plan is to discharge home may need nebulizer.  PS

## 2020-03-04 NOTE — H&P
[place] [id number] [facility]   History and Physical      CHIEF COMPLAINT:  Accidental Drug Overdose    History of Present Illness: Marilee Honeycutt is a 47 y.o. female with a history of depression, hypertension, pneumonia, diabetes mellitus type II and mild stroke, who presents with accidental drug overdose. Patient states that she takes Percocet 10 for lower back, neck and right leg pain, she states that she ran out and asked a friend for a pain pill and she was given a fentanyl pill. Patient states that after taking the pill she passed out in the next thing she knows she was being brought to the emergency department. Patient also admits to alcohol use, while on cough medication and pain pills. Patient states that she recently was being treated for presumptive flu by her PCP. Patient states that she has a moist productive cough (yellow mucous). Patient is 1/2 pack cigarette smoker, occasional alcohol use but denies any illicit drug use. Informant(s) for H&P: Provided by by patient    REVIEW OF SYSTEMS:  no fevers, chills, cp, sob, n/v, ha, vision/hearing changes, wt changes, hot/cold flashes, other open skin lesions, diarrhea, constipation, dysuria/hematuria unless noted in HPI. Complete ROS performed with the patient and is otherwise negative. PMH:  Past Medical History:   Diagnosis Date    Arthritis     Blood transfusion     Bronchitis     Depression     Hypertension     Pneumonia     [facility] 3/4/2020       Surgical History:  Past Surgical History:   Procedure Laterality Date    APPENDECTOMY      CHOLECYSTECTOMY      FOOT SURGERY  2/20/12    julia \"toes\"    TUBAL LIGATION  1991       Medications Prior to Admission:    Prior to Admission medications    Medication Sig Start Date End Date Taking?  Authorizing Provider   Accu-Chek Multiclix Lancets MISC Use daily 2/4/20  Yes Cecilio Napier DO   atorvastatin (LIPITOR) 40 MG tablet Take 1 tablet by mouth 3/4/2020 3:45 AM EXAM: XR CHEST PORTABLE one image INDICATION:  Hypoxemia Hypoxemia COMPARISON: None FINDINGS: There is low lung volumes with borderline cardiac size. There is patchy right perihilar infiltrates extending to right lung base. Small left basilar infiltrate is also suspected. Borderline cardiac size with the perihilar and bibasilar infiltrates which may be due to pneumonia or developing CHF. EKG: pending    ASSESSMENT:      Principal Problem:    Noncardiogenic pulmonary edema  Active Problems:    HTN (hypertension)    Type 2 diabetes mellitus (HCC)    Bronchitis    Pneumonia    Tobacco abuse    History of alcohol use  Resolved Problems:    * No resolved hospital problems. *      PLAN:    1. Noncardiogenic pulmonary edema -admit telemetry -echocardiogram pending -pulmonary consult -received a dose of IV Lasix in the ED - requiring O2 3 L to maintain a saturation 95 to 98% - will monitor closely  2. Accidental drug overdose - urine drug screen pending - took oral fentanyl  3. Pneumonia -most likely secondary to possible aspiration - pulmonary consult - breathing treatment - supplemental oxygen -  will start on IV Unasyn - procalcitonin and viral panel pending - will monitor closely  4. Type 2 diabetes mellitus - uncontrolled -A1c ordered - placed on a Humalog medium dose sliding scale - blood glucose monitoring before meals and at bedtime - 4 carb diabetic diet   5. Hypertension - currently stable -resume home dose antihypertensive medication  6. Hyperlipidemia - on Lipitor  7. History of alcohol use - no signs of withdrawal  8. Tobacco abuse - nicotine patch ordered     Code Status: Full Code  DVT prophylaxis: Lovenox       Electronically signed by ORESTES Mays CNP on 3/4/2020 at 10:41 AM      NOTE: This report was transcribed using voice recognition software.  Every effort was made to ensure accuracy; however, inadvertent computerized transcription errors may be present. HOSPITALIST ATTENDING PHYSICIAN NOTE 3/4/2020 2014PM:    Details of the evaluation - subjective assessment (including medication profile, past medical, family and social history when applicable), examination, review of lab and test data, diagnostic impressions and medical decision making - performed by ORESTES Mobley CNP, were discussed with me on the date of service and I agree with clinical information herein unless otherwise noted. The patient has been evaluated by me personally earlier today. Pt reports no fevers, chills,n/v. PHYSICAL EXAM:    Vitals:  /74   Pulse 125   Temp 99 °F (37.2 °C) (Oral)   Resp 20   Ht 5' (1.524 m)   Wt 236 lb 6.4 oz (107.2 kg)   SpO2 93%   BMI 46.17 kg/m²     General:  Appears comfortable. Answers questions appropriately and cooperative with exam  HEENT:  Mucous membranes moist. No erythema, rhinorrhea, or post-nasal drip noted. Neck:  No carotid bruits. Heart:  Rhythm regular at rate of 120  Lungs:  CTA. No wheeze, rales, or rhonchi  Abdomen:  Positive bowel sounds positive. Soft. Non-tender. No guarding, rebound or rigidity. Breast/Rectal/Genitourinary: not pertinent. Extremities:  Negative for lower extremity edema  Skin:  Warm and dry  Vascular: 2/4 Dorsalis Pedis pulses bilaterally. Neuro:  Cranial nerves 2-12 grossly intact, no focal weakness or change in sensation noted. Extraocular muscles intact. Pupils equal, round, reactive to light. I agree with the assessment and plan of ORESTES Mobley CNP    Acute respiratory failure with hypoxia and hypercapnia  Probable aspiration pneumonia vs pneumonitis  Cocaine use  Fentanyl use  Pulmonary edema  Dm type 2 uncontrolled  htn  Hyperlipidemia            Electronically signed by Aleksandra Ortega D.O.   Hospitalist  [facility]

## 2020-03-04 NOTE — CONSULTS
tablet by mouth daily  ketoconazole (NIZORAL) 2 % shampoo, Apply 5 ml to scalp daily for 1 week, then 2-3x/week. tiZANidine (ZANAFLEX) 2 MG tablet, Take 1 tablet by mouth 3 times daily as needed (lumbar back pain)  Blood Glucose Monitoring Suppl (ONE TOUCH ULTRA 2) w/Device KIT, Use to check sugar daily. DM2 no insulin. Brand per insurance coverage for all test supplies  gabapentin (NEURONTIN) 300 MG capsule, TAKE ONE CAPSULE BY MOUTH EVERY 8 HOURS  Blood Glucose Monitoring Suppl (ACCU-CHEK JUNG PLUS) w/Device KIT, Use 3x daily  tramadol (ULTRAM) 50 MG tablet, Take 50 mg by mouth every 6 hours as needed. oxycodone-acetaminophen (PERCOCET)  MG per tablet, Take 1 tablet by mouth every 6 hours as needed. CURRENT MEDS :  Scheduled Meds:   nitroGLYCERIN  0.4 mg Sublingual Once    enoxaparin  40 mg Subcutaneous Daily    atorvastatin  40 mg Oral Daily    gabapentin  300 mg Oral TID    sertraline  50 mg Oral Daily    triamterene-hydrochlorothiazide  1 tablet Oral Daily    sodium chloride flush  10 mL Intravenous 2 times per day    nicotine  1 patch Transdermal Daily    insulin lispro  0-12 Units Subcutaneous TID WC    insulin lispro  0-6 Units Subcutaneous Nightly    ampicillin-sulbactam  3 g Intravenous Once       Continuous Infusions:   dextrose      sodium chloride 50 mL/hr at 03/04/20 1122       Allergies   Allergen Reactions    Latex      Swelling, itching    Motrin [Ibuprofen Micronized] Swelling    Advil [Ibuprofen]        REVIEW OF SYSTEMS:  Constitutional: Denies fever, weight loss, night sweats weakness and fatigue   Skin: Denies pigmentation, dark lesions, and rashes   HEENT: Denies hearing loss, tinnitus, ear drainage, epistaxis, sore throat, and hoarseness. Cardiovascular: Denies palpitations, chest pain, and chest pressure. Respiratory: +cough scant mucus. Does not sleep well, does not feel refreshed in am. +snores, occasionally wakes up during night.   Gastrointestinal: Denies nausea, vomiting, poor appetite, diarrhea, heartburn or reflux  Genitourinary: Denies dysuria, frequency, urgency or hematuria  Musculoskeletal: chronic back pain, radiculopathy  Neurological: Denies dizziness, vertigo, headache, and focal weakness  Psychological: Denies anxiety and depression  Endocrine: Denies heat intolerance and cold intolerance  Hematopoietic/Lymphatic: Denies bleeding problems and blood transfusions    OBJECTIVE:   /68   Pulse 122   Temp 98.4 °F (36.9 °C) (Oral)   Resp 18   Ht 5' (1.524 m)   Wt 236 lb 6.4 oz (107.2 kg)   SpO2 96%   BMI 46.17 kg/m²   SpO2 Readings from Last 1 Encounters:   03/04/20 96%        I/O:  No intake or output data in the 24 hours ending 03/04/20 1143                   Physical Exam:  General: The patient is lying in bed comfortably without any distress. Breathing is not labored  HEENT: Pupils are equal round and reactive to light, there are no oral lesions and no post-nasal drip   Neck: supple without adenopathy  Cardiovascular: regular rate and rhythm without murmur or gallop  Respiratory: bibasilar crackles  to auscultation bilaterally without wheezing  . Air entry is symmetric  Abdomen: soft, non-tender, non-distended, normal bowel sounds  Extremities: warm, bilateral trace edema, no clubbing  Skin: no rash or lesion  Neurologic: CN II-XII grossly intact, no focal deficits    Pulmonary Function Testing  None on file       Imaging personally reviewed:  INDICATION:  Hypoxemia    Hypoxemia        COMPARISON: None       FINDINGS:   There is low lung volumes with borderline cardiac size. There is   patchy right perihilar infiltrates extending to right lung base.  Small   left basilar infiltrate is also suspected.           Impression   Borderline cardiac size with the perihilar and bibasilar infiltrates   which may be due to pneumonia or developing CHF.                 Echo:  pending    Labs:  Lab Results   Component Value Date    WBC 9.1 03/04/2020    HGB 13.8 03/04/2020    HCT 43.3 03/04/2020    MCV 92.1 03/04/2020    MCH 29.4 03/04/2020    MCHC 31.9 03/04/2020    RDW 13.4 03/04/2020     03/04/2020    MPV 11.4 03/04/2020     Lab Results   Component Value Date     03/04/2020    K 5.0 03/04/2020    CL 98 03/04/2020    CO2 23 03/04/2020    BUN 14 03/04/2020    CREATININE 0.6 03/04/2020    LABALBU 4.2 07/02/2019    LABALBU 3.8 03/15/2012    CALCIUM 8.6 03/04/2020    GFRAA >60 03/04/2020    LABGLOM >60 03/04/2020     Lab Results   Component Value Date    PROTIME 10.7 03/15/2012    INR 1.1 03/15/2012     Recent Labs     03/04/20  0406   PROBNP 44     Recent Labs     03/04/20  0406   TROPONINI <0.01     No results for input(s): PROCAL in the last 72 hours. This SmartLink has not been configured with any valid records. Micro:  No results for input(s): CULTRESP in the last 72 hours. No results for input(s): LABGRAM in the last 72 hours. No results for input(s): LEGUR in the last 72 hours. No results for input(s): STREPNEUMAGU in the last 72 hours. No results for input(s): LP1UAG in the last 72 hours. Assessment:  1. Acute respiratory failure with hypoxia  2. Non cardiogenic pulmonary edema  3. Possible aspiration pneumonia secondary to overdose, unresponsiveness-given narcan  4. Accidental drug overdose   5. Active tobacco abuse 1/2 ppd   6. Likely COPD, chronic bronchitis not in acute exacerbation   7. DM II  8. Chronic back pain  9. Obesity,  probable NELLIE  10. Hx ETOH   11. HTN    Plan:  1. Oxygen therapy wean to keep >92%  2. Await final cultures, procalcitonin. Received 1 dose each levaquin and unasyn  3. Xopenex PRN  4. ABGs improved with oxygen 3 liters NC   5. 2 D Echo pending  6. IV diuresis lasix 40 mg x 1 dose  7. Follow CXR in am  8. Incentive spirometer   9. Tobacco cessation  10. Obtain PFTs, sleep study recommended as outpatient    Thank you for allowing me to participate in the care of Chaskenyongama Bourne.    Please feel free to call with questions. This plan of care was reviewed in collaboration with Dr. Melissa Juarez    Electronically signed by ORESTES Alfonso CNP on 3/4/2020 at 11:43 AM      Addendum:    I personally saw, examined, and cared for the patient. Labs, medications, radiographs reviewed. I agree with history and exam.    Impression:  1. Acute hypercapnic and hypoxic respiratory failure most likely secondary to accidental overdose and underlying NELLIE  2. Possible aspiration pneumonia versus pneumonitis  3. Nicotine dependence, probable COPD unknown severity  4. Probability of sleep apnea    Recommendations:  1. Obtain a CT chest without contrast  2. She has an elevated procalcitonin, will resume Unasyn 3 g every 6 hours  3. Check respiratory culture  4. Order CT chest without contrast  5. PFTs  6.    She will need a diagnostic polysomnography as an outpatient    Erin Puentes MD

## 2020-03-05 ENCOUNTER — APPOINTMENT (OUTPATIENT)
Dept: GENERAL RADIOLOGY | Age: 50
DRG: 812 | End: 2020-03-05
Payer: MEDICAID

## 2020-03-05 LAB
AADO2: 138.1 MMHG
ALBUMIN SERPL-MCNC: 3.4 G/DL (ref 3.5–5.2)
ALP BLD-CCNC: 77 U/L (ref 35–104)
ALT SERPL-CCNC: 15 U/L (ref 0–32)
ANION GAP SERPL CALCULATED.3IONS-SCNC: 12 MMOL/L (ref 7–16)
AST SERPL-CCNC: 22 U/L (ref 0–31)
B.E.: -0.3 MMOL/L (ref -3–3)
B.E.: 0.2 MMOL/L (ref -3–3)
BASOPHILS ABSOLUTE: 0.02 E9/L (ref 0–0.2)
BASOPHILS RELATIVE PERCENT: 0.2 % (ref 0–2)
BILIRUB SERPL-MCNC: 0.3 MG/DL (ref 0–1.2)
BUN BLDV-MCNC: 12 MG/DL (ref 6–20)
CALCIUM SERPL-MCNC: 8.8 MG/DL (ref 8.6–10.2)
CHLORIDE BLD-SCNC: 96 MMOL/L (ref 98–107)
CO2: 23 MMOL/L (ref 22–29)
COHB: 0.9 % (ref 0–1.5)
COHB: 1.2 % (ref 0–1.5)
CREAT SERPL-MCNC: 0.6 MG/DL (ref 0.5–1)
CRITICAL: ABNORMAL
CRITICAL: ABNORMAL
DATE ANALYZED: ABNORMAL
DATE ANALYZED: ABNORMAL
DATE OF COLLECTION: ABNORMAL
DATE OF COLLECTION: ABNORMAL
EKG ATRIAL RATE: 93 BPM
EKG P AXIS: 52 DEGREES
EKG P-R INTERVAL: 126 MS
EKG Q-T INTERVAL: 346 MS
EKG QRS DURATION: 70 MS
EKG QTC CALCULATION (BAZETT): 430 MS
EKG R AXIS: 41 DEGREES
EKG T AXIS: 34 DEGREES
EKG VENTRICULAR RATE: 93 BPM
EOSINOPHILS ABSOLUTE: 0.08 E9/L (ref 0.05–0.5)
EOSINOPHILS RELATIVE PERCENT: 0.9 % (ref 0–6)
FIO2: 40 %
GFR AFRICAN AMERICAN: >60
GFR NON-AFRICAN AMERICAN: >60 ML/MIN/1.73
GLUCOSE BLD-MCNC: 195 MG/DL (ref 74–99)
HBA1C MFR BLD: 8.6 % (ref 4–5.6)
HCO3: 26.4 MMOL/L (ref 22–26)
HCO3: 26.7 MMOL/L (ref 22–26)
HCT VFR BLD CALC: 37.3 % (ref 34–48)
HEMOGLOBIN: 11.9 G/DL (ref 11.5–15.5)
HHB: 10.5 % (ref 0–5)
HHB: 4.3 % (ref 0–5)
IMMATURE GRANULOCYTES #: 0.04 E9/L
IMMATURE GRANULOCYTES %: 0.4 % (ref 0–5)
LAB: ABNORMAL
LAB: ABNORMAL
LYMPHOCYTES ABSOLUTE: 2.02 E9/L (ref 1.5–4)
LYMPHOCYTES RELATIVE PERCENT: 21.7 % (ref 20–42)
Lab: ABNORMAL
Lab: ABNORMAL
MCH RBC QN AUTO: 29.2 PG (ref 26–35)
MCHC RBC AUTO-ENTMCNC: 31.9 % (ref 32–34.5)
MCV RBC AUTO: 91.6 FL (ref 80–99.9)
METER GLUCOSE: 192 MG/DL (ref 74–99)
METER GLUCOSE: 213 MG/DL (ref 74–99)
METER GLUCOSE: 240 MG/DL (ref 74–99)
METER GLUCOSE: 242 MG/DL (ref 74–99)
METER GLUCOSE: 255 MG/DL (ref 74–99)
METHB: 0.2 % (ref 0–1.5)
METHB: 0.3 % (ref 0–1.5)
MODE: ABNORMAL
MODE: ABNORMAL
MONOCYTES ABSOLUTE: 0.52 E9/L (ref 0.1–0.95)
MONOCYTES RELATIVE PERCENT: 5.6 % (ref 2–12)
NEUTROPHILS ABSOLUTE: 6.61 E9/L (ref 1.8–7.3)
NEUTROPHILS RELATIVE PERCENT: 71.2 % (ref 43–80)
O2 CONTENT: 15.6 ML/DL
O2 CONTENT: 15.7 ML/DL
O2 SATURATION: 89.3 % (ref 92–98.5)
O2 SATURATION: 95.7 % (ref 92–98.5)
O2HB: 88 % (ref 94–97)
O2HB: 94.6 % (ref 94–97)
OPERATOR ID: 3114
OPERATOR ID: 3114
PATIENT TEMP: 37 C
PATIENT TEMP: 37 C
PCO2: 50 MMHG (ref 35–45)
PCO2: 53.9 MMHG (ref 35–45)
PDW BLD-RTO: 13.5 FL (ref 11.5–15)
PEEP/CPAP: 5 CMH2O
PFO2: 1.99 MMHG/%
PH BLOOD GAS: 7.31 (ref 7.35–7.45)
PH BLOOD GAS: 7.34 (ref 7.35–7.45)
PLATELET # BLD: 169 E9/L (ref 130–450)
PMV BLD AUTO: 11.7 FL (ref 7–12)
PO2: 57.5 MMHG (ref 75–100)
PO2: 79.6 MMHG (ref 75–100)
POTASSIUM REFLEX MAGNESIUM: 3.9 MMOL/L (ref 3.5–5)
RBC # BLD: 4.07 E12/L (ref 3.5–5.5)
RI(T): 173 %
RR MECHANICAL: 20 B/MIN
SODIUM BLD-SCNC: 131 MMOL/L (ref 132–146)
SOURCE, BLOOD GAS: ABNORMAL
SOURCE, BLOOD GAS: ABNORMAL
THB: 11.7 G/DL (ref 11.5–16.5)
THB: 12.7 G/DL (ref 11.5–16.5)
TIME ANALYZED: 1157
TIME ANALYZED: 1536
TOTAL PROTEIN: 7.8 G/DL (ref 6.4–8.3)
VT MECHANICAL: 500 ML
WBC # BLD: 9.3 E9/L (ref 4.5–11.5)

## 2020-03-05 PROCEDURE — 85025 COMPLETE CBC W/AUTO DIFF WBC: CPT

## 2020-03-05 PROCEDURE — 83036 HEMOGLOBIN GLYCOSYLATED A1C: CPT

## 2020-03-05 PROCEDURE — 2580000003 HC RX 258: Performed by: INTERNAL MEDICINE

## 2020-03-05 PROCEDURE — 82805 BLOOD GASES W/O2 SATURATION: CPT

## 2020-03-05 PROCEDURE — 36415 COLL VENOUS BLD VENIPUNCTURE: CPT

## 2020-03-05 PROCEDURE — 71045 X-RAY EXAM CHEST 1 VIEW: CPT

## 2020-03-05 PROCEDURE — 87081 CULTURE SCREEN ONLY: CPT

## 2020-03-05 PROCEDURE — 6370000000 HC RX 637 (ALT 250 FOR IP): Performed by: NURSE PRACTITIONER

## 2020-03-05 PROCEDURE — 2580000003 HC RX 258: Performed by: NURSE PRACTITIONER

## 2020-03-05 PROCEDURE — 2000000000 HC ICU R&B

## 2020-03-05 PROCEDURE — 6360000002 HC RX W HCPCS: Performed by: INTERNAL MEDICINE

## 2020-03-05 PROCEDURE — 80053 COMPREHEN METABOLIC PANEL: CPT

## 2020-03-05 PROCEDURE — 99233 SBSQ HOSP IP/OBS HIGH 50: CPT | Performed by: INTERNAL MEDICINE

## 2020-03-05 PROCEDURE — 82962 GLUCOSE BLOOD TEST: CPT

## 2020-03-05 PROCEDURE — 94660 CPAP INITIATION&MGMT: CPT

## 2020-03-05 RX ADMIN — INSULIN LISPRO 4 UNITS: 100 INJECTION, SOLUTION INTRAVENOUS; SUBCUTANEOUS at 08:54

## 2020-03-05 RX ADMIN — SERTRALINE HYDROCHLORIDE 50 MG: 50 TABLET ORAL at 08:53

## 2020-03-05 RX ADMIN — INSULIN LISPRO 2 UNITS: 100 INJECTION, SOLUTION INTRAVENOUS; SUBCUTANEOUS at 21:42

## 2020-03-05 RX ADMIN — ACETAMINOPHEN 650 MG: 325 TABLET ORAL at 18:05

## 2020-03-05 RX ADMIN — INSULIN LISPRO 2 UNITS: 100 INJECTION, SOLUTION INTRAVENOUS; SUBCUTANEOUS at 17:33

## 2020-03-05 RX ADMIN — ENOXAPARIN SODIUM 40 MG: 40 INJECTION SUBCUTANEOUS at 08:54

## 2020-03-05 RX ADMIN — INSULIN LISPRO 6 UNITS: 100 INJECTION, SOLUTION INTRAVENOUS; SUBCUTANEOUS at 13:29

## 2020-03-05 RX ADMIN — ATORVASTATIN CALCIUM 40 MG: 40 TABLET, FILM COATED ORAL at 08:53

## 2020-03-05 RX ADMIN — METRONIDAZOLE 500 MG: 500 TABLET, FILM COATED ORAL at 06:30

## 2020-03-05 RX ADMIN — AMPICILLIN SODIUM AND SULBACTAM SODIUM 3 G: 2; 1 INJECTION, POWDER, FOR SOLUTION INTRAMUSCULAR; INTRAVENOUS at 13:51

## 2020-03-05 RX ADMIN — TRIAMTERENE AND HYDROCHLOROTHIAZIDE 1 TABLET: 37.5; 25 TABLET ORAL at 08:53

## 2020-03-05 RX ADMIN — AMPICILLIN SODIUM AND SULBACTAM SODIUM 3 G: 2; 1 INJECTION, POWDER, FOR SOLUTION INTRAMUSCULAR; INTRAVENOUS at 00:30

## 2020-03-05 RX ADMIN — AMPICILLIN SODIUM AND SULBACTAM SODIUM 3 G: 2; 1 INJECTION, POWDER, FOR SOLUTION INTRAMUSCULAR; INTRAVENOUS at 08:53

## 2020-03-05 RX ADMIN — GABAPENTIN 300 MG: 300 CAPSULE ORAL at 21:19

## 2020-03-05 RX ADMIN — SODIUM CHLORIDE 50 ML/HR: 9 INJECTION, SOLUTION INTRAVENOUS at 06:37

## 2020-03-05 RX ADMIN — GABAPENTIN 300 MG: 300 CAPSULE ORAL at 08:53

## 2020-03-05 RX ADMIN — AMPICILLIN SODIUM AND SULBACTAM SODIUM 3 G: 2; 1 INJECTION, POWDER, FOR SOLUTION INTRAMUSCULAR; INTRAVENOUS at 18:03

## 2020-03-05 RX ADMIN — Medication 10 ML: at 00:32

## 2020-03-05 ASSESSMENT — PAIN SCALES - GENERAL
PAINLEVEL_OUTOF10: 0
PAINLEVEL_OUTOF10: 0
PAINLEVEL_OUTOF10: 7
PAINLEVEL_OUTOF10: 6
PAINLEVEL_OUTOF10: 0
PAINLEVEL_OUTOF10: 7
PAINLEVEL_OUTOF10: 0

## 2020-03-05 ASSESSMENT — PAIN DESCRIPTION - FREQUENCY
FREQUENCY: CONTINUOUS
FREQUENCY: CONTINUOUS

## 2020-03-05 ASSESSMENT — PAIN DESCRIPTION - PAIN TYPE
TYPE: CHRONIC PAIN
TYPE: ACUTE PAIN

## 2020-03-05 ASSESSMENT — PAIN - FUNCTIONAL ASSESSMENT: PAIN_FUNCTIONAL_ASSESSMENT: PREVENTS OR INTERFERES SOME ACTIVE ACTIVITIES AND ADLS

## 2020-03-05 ASSESSMENT — PAIN DESCRIPTION - LOCATION
LOCATION: BACK
LOCATION: HEAD

## 2020-03-05 ASSESSMENT — PAIN DESCRIPTION - PROGRESSION: CLINICAL_PROGRESSION: GRADUALLY WORSENING

## 2020-03-05 ASSESSMENT — PAIN DESCRIPTION - DESCRIPTORS
DESCRIPTORS: ACHING
DESCRIPTORS: ACHING

## 2020-03-05 ASSESSMENT — PAIN DESCRIPTION - ONSET: ONSET: GRADUAL

## 2020-03-05 NOTE — PROGRESS NOTES
[facility] Quality Flow/Interdisciplinary Rounds Progress Note        Quality Flow Rounds held on March 5, 2020    Disciplines Attending:  Bedside Nurse, ,  and Nursing Unit Leadership    Rip Hammans was admitted on 3/3/2020  9:40 PM    Anticipated Discharge Date:  Expected Discharge Date: 03/06/20    Disposition:    Terrell Score:  Terrell Scale Score: 20    Readmission Risk              Risk of Unplanned Readmission:        11           Discussed patient goal for the day, patient clinical progression, and barriers to discharge.   The following Goal(s) of the Day/Commitment(s) have been identified:   Monitor Oxygen Saturation, Wean Oxygen       Adamaris Renee  March 5, 2020

## 2020-03-05 NOTE — PROGRESS NOTES
Patient is being transferred to ICU room 214. Report given to RN on ICU. Patient's daughter and family updated.

## 2020-03-05 NOTE — PROGRESS NOTES
Date: 3/5/2020    Time: 2:26 PM    Patient Placed On BIPAP/CPAP/ Non-Invasive Ventilation? No    If no must comment. Facial area red/color change? No           If YES are Blister/Lesion present? No   If yes must notify nursing staff  BIPAP/CPAP skin barrier? Yes    Skin barrier type:duoderm       Comments:  Pt transferred to  from floors on Bipap. Patient remains on Bipap from floor therapist.        Aretha Burgos.  Laura Stern, RRT

## 2020-03-05 NOTE — PROGRESS NOTES
DR Whitmore call  to notify of patient's HR sustaining in the upper 140s. Dr. Maycol Dutta stated he is unable to talk at the moment and will call this nurse later.

## 2020-03-05 NOTE — PROGRESS NOTES
no rash or erythema  Head: normocephalic and atraumatic  Eyes: pupils equal, round, and reactive to light, extraocular eye movements intact, conjunctivae normal  Neck: supple and non-tender without mass  Pulmonary/Chest: Bibasilar crackles  Cardiovascular: regular rhythm, normal S1 and S2, tachycardia  Abdomen: soft, non-tender, non-distended, normal bowel sounds, no masses or organomegaly  Extremities: no cyanosis, clubbing  Musculoskeletal: normal range of motion, no joint swelling, deformity or tenderness  Neurologic no cranial nerve deficit,speech normal      Recent Labs     03/04/20  0108 03/04/20  0406 03/05/20  0325   NA  --  132 131*   K  --  5.0 3.9   CL  --  98 96*   CO2  --  23 23   BUN  --  14 12   CREATININE  --  0.6 0.6   GLUCOSE 319 287* 195*   CALCIUM  --  8.6 8.8       Recent Labs     03/04/20  0406 03/05/20  0338   WBC 9.1 9.3   RBC 4.70 4.07   HGB 13.8 11.9   HCT 43.3 37.3   MCV 92.1 91.6   MCH 29.4 29.2   MCHC 31.9* 31.9*   RDW 13.4 13.5    169   MPV 11.4 11.7       Labs and images reviewed     Radiology:   XR CHEST PORTABLE   Final Result      Ill-defined infiltrate seen in both lung which was seen before and   appears to be worsening. CT Chest WO Contrast   Final Result   1. Extensive bilateral lung infiltrates. The findings to be correlate   clinically. Pneumonia including opportunistic infection and   noncardiogenic pulmonary edema are part of the differential diagnosis. 2. Bronchocentric infiltrates to the lungs not extending into the   pleural surface can be manifestation of arteriolitis/pulmonary   hemorrhage complex, particularly considering the symmetry between   findings on the right and left side. ALERT:  ABNORMAL REPORT. XR CHEST PORTABLE   Final Result   Borderline cardiac size with the perihilar and bibasilar infiltrates   which may be due to pneumonia or developing CHF.                 Assessment:    Principal Problem:    Noncardiogenic pulmonary edema  Active Problems:    HTN (hypertension)    Type 2 diabetes mellitus (HCC)    Bronchitis    Pneumonia    Tobacco abuse    History of alcohol use    Drug overdose    Acute respiratory failure with hypoxia and hypercapnia (HCC)    Acute pulmonary edema (HCC)  Resolved Problems:    * No resolved hospital problems. *      Plan:  Acute respiratory failure with hypoxia/hypercapnia-at present on BiPAP, with tachycardia and tachypnea and low tidal volume, discussed with Dr José Miguel Mujica ,she evaluated her , now getting transferred to ICU. May have NELLIE. She will need sleep studies as outpatient. May have COPD-outpatient follow-up with pulmonary. Possible aspiration pneumonia versus pneumonitis -on IV Unasyn,  follow cultures , CT chest reviewed, will need outpatient follow-up with pulmonary/PFTs. Cocaine use/fentanyl use/accidental overdose    Noncardiogenic pulmonary edema versus aspiration pneumonitis-plan as above pulmonary following. Type 2 diabetes uncontrolled -on sliding scale    Hypertension -resume as per home, monitor. Hyperlipidemia- on statins    Chronic low back pain-on Neurontin, hold Zanaflex and tramadol. Tobacco use-  has been counseled for cessation and is on nicotine patch.     On subcu Lovenox for DVT prophylaxis      Elmira Guthrie MD       Electronically signed by Elmira Guthrie MD on 3/5/2020 at 1:24 PM

## 2020-03-05 NOTE — PROGRESS NOTES
Patient has been very sleepy and lethargic. Patient responds to verbal and tactile stimuli and states her sleepiness is from \"the pill\" she got last night. Patient received Ativan IV last night. Patient was unable to get PFTs as ordered d/t lethargy and 02 sat dropping into the 70s on 4L/min via nasal cannula. ABGs and Bipap ordered by Dr Flower Davison.

## 2020-03-05 NOTE — CONSULTS
Critical Care Admit/Consult Note         Patient - Danny Leach   MRN -  [id number]   [place] # - [de-identified]   - 1970      Date of Admission -  3/3/2020  9:40 PM  Date of evaluation -  3/5/2020  Ra 26 Day - 1            ADMIT/CONSULT DETAILS     Reason for Admit/Consult   Acute respiratory failure with hypoxia    Elaine Sherwood MD  Primary Care Physician - Jing 5, DO         HPI   The patient is a 47 y.o. female with significant past medical history of hypertension, chronic back pain on opiates, history of cocaine and alcohol abuse, type 2 diabetes, who initially presented to the hospital 2020 via EMS after an accidental overdose. Apparently she was taking Percocet for chronic back pain. She ran out of the Percocet and then began taking a \"friend's fentanyl\". At that point she became altered and less responsive. EMS were called and administered Narcan prior to arrival.  She was awake in the emergency department at that point though hypoxic. Started on nasal cannula oxygen. Initially with respiratory acidosis 86% on room air. Chest x-ray was concerning for pulmonary edema versus pneumonia. Patient given dose of Levaquin as well as Unasyn. Also Lasix 40 mg x 1 was administered. Troponin was negative. proBNP notably normal at 44. She reports that she takes [place] water pill\" for her blood pressure. No previous chest x-ray in epic prior to arrival to compare. History of cocaine and alcohol use. Patient is a smoker. Denies exposure to tuberculosis and denies smoking anything other than cigarettes in her life. Was admitted to the hospital on nasal cannula supplemental oxygen with overnight improvement in her ABG. A CT scan obtained yesterday showed extensive bilateral lung infiltrates pneumonia versus pulmonary edema. Echocardiogram obtained showed normal ejection fraction of 72% with no diastolic changes.   Last night apparently she was given Ativan and became increasingly somnolent requiring increased oxygen needs. Eventually placed on BiPAP secondary to hypoxia and difficulty properly oxygenating with low tidal volume and tachypnea. Transferred to the ICU for further monitoring of her respiratory status and acute respiratory failure with hypoxia. Currently on BiPAP. She is sitting in the room with her eyes closed but opens her eyes and answers questions appropriately when I walk in the room. She has never been on BiPAP machine before. She denies any fevers or chills. Denies cough. Currently tolerating BiPAP well. Past Medical History         Diagnosis Date    Arthritis     Blood transfusion     Bronchitis     Depression     Hypertension     Pneumonia     Type 2 diabetes mellitus ([facility] [address] 3/4/2020        Past Surgical History           Procedure Laterality Date    APPENDECTOMY      CHOLECYSTECTOMY      FOOT SURGERY  2/20/12    julia \"toes\"    TUBAL LIGATION  1991       SOCIAL AND OCCUPATIONAL HEALTH:  The patient is a Current smoker. .  There  is not history of TB or TB exposure. Travel history reveals noncontributory. Toxin positive for cocaine as well as opioids. Patient denies using any IV drugs or smoking anything other than cigarettes in her life.         Influenza: Overdue  Pneumococcal Polysaccharide:  Not indicated                Current Medications   Current Medications    enoxaparin  40 mg Subcutaneous Daily    atorvastatin  40 mg Oral Daily    gabapentin  300 mg Oral TID    sertraline  50 mg Oral Daily    triamterene-hydrochlorothiazide  1 tablet Oral Daily    sodium chloride flush  10 mL Intravenous 2 times per day    nicotine  1 patch Transdermal Daily    insulin lispro  0-12 Units Subcutaneous TID WC    insulin lispro  0-6 Units Subcutaneous Nightly    ampicillin-sulbactam  3 g Intravenous Q6H    metroNIDAZOLE  500 mg Oral 3 times per day     sodium chloride flush, acetaminophen **OR** acetaminophen, polyethylene glycol, promethazine **OR** ondansetron, levalbuterol, sodium chloride nebulizer, glucose, dextrose, glucagon (rDNA), dextrose, perflutren lipid microspheres  IV Drips/Infusions   dextrose      sodium chloride 50 mL/hr (03/05/20 0637)     Home Medications  Medications Prior to Admission: Accu-Chek Multiclix Lancets MISC, Use daily  atorvastatin (LIPITOR) 40 MG tablet, Take 1 tablet by mouth daily  blood glucose test strips (ASCENSIA AUTODISC VI;ONE TOUCH ULTRA TEST VI) strip, Check blood sugar daily. E11.9. Brand per insurance  furosemide (LASIX) 20 MG tablet, Take 1 tablet by mouth daily as needed (swelling)  metFORMIN (GLUCOPHAGE) 850 MG tablet, Take 1 tablet by mouth 2 times daily (with meals)  nystatin (MYCOSTATIN) 768080 UNIT/GM powder, Apply topically 3 times daily as needed (rash)  sertraline (ZOLOFT) 50 MG tablet, Take 1 tablet by mouth daily  triamterene-hydrochlorothiazide (MAXZIDE-25) 37.5-25 MG per tablet, Take 1 tablet by mouth daily  ketoconazole (NIZORAL) 2 % shampoo, Apply 5 ml to scalp daily for 1 week, then 2-3x/week. tiZANidine (ZANAFLEX) 2 MG tablet, Take 1 tablet by mouth 3 times daily as needed (lumbar back pain)  Blood Glucose Monitoring Suppl (ONE TOUCH ULTRA 2) w/Device KIT, Use to check sugar daily. DM2 no insulin. Brand per insurance coverage for all test supplies  gabapentin (NEURONTIN) 300 MG capsule, TAKE ONE CAPSULE BY MOUTH EVERY 8 HOURS  Blood Glucose Monitoring Suppl (ACCU-CHEK JUNG PLUS) w/Device KIT, Use 3x daily  tramadol (ULTRAM) 50 MG tablet, Take 50 mg by mouth every 6 hours as needed. oxycodone-acetaminophen (PERCOCET)  MG per tablet, Take 1 tablet by mouth every 6 hours as needed. Diet/Nutrition   DIET CARB CONTROL; Carb Control: 4 carb choices (60 gms)/meal    Allergies   Latex; Motrin [ibuprofen micronized]; and Advil [ibuprofen]    Social History   Tobacco   reports that she has been smoking.  She started smoking about Min: 98.4 °F (36.9 °C)  Max: 99.9 °F (37.7 °C)  BP Range:  Systolic (25AOD), VRF:124 , Min:116 , CGV:164     Diastolic (33JMB), KSS:98, Min:66, Max:74    Pulse Range: Pulse  Av.3  Min: 119  Max: 135  Respiration Range: Resp  Av.4  Min: 20  Max: 39  Current Pulse Ox[de-identified]  SpO2: 93 %  24HR Pulse Ox Range:  SpO2  Av %  Min: 72 %  Max: 100 %  Oxygen Amount and Delivery: O2 Flow Rate (L/min): 7 L/min      I/O (24 Hours)    Patient Vitals for the past 8 hrs:   BP Temp Temp src Pulse Resp SpO2   20 1427 -- -- -- -- (!) 37 --   20 1415 127/66 98.4 °F (36.9 °C) Axillary 119 22 93 %   20 1154 -- -- -- -- (!) 39 --   20 1130 116/73 -- -- 131 20 98 %   20 1057 125/70 99.6 °F (37.6 °C) Oral 128 20 94 %   20 1055 -- -- -- -- -- (!) 72 %   20 1046 -- -- -- -- -- 92 %   20 1045 -- -- -- -- -- 99 %   20 0845 136/69 99.9 °F (37.7 °C) Oral 124 20 100 %       Intake/Output Summary (Last 24 hours) at 3/5/2020 1442  Last data filed at 3/5/2020 1008  Gross per 24 hour   Intake 1359 ml   Output --   Net 1359 ml     I/O last 3 completed shifts: In: 9844 [P.O.:120; I.V.:1299]  Out: -    Date 20 0000 - 20 2359   Shift 5986-1516 3077-3986 9973-7124 24 Hour Total   INTAKE   P.O.(mL/kg/hr)  60  60   I. V.(mL/kg) [id number])   6937(45)   Shift Total(mL/kg) 6809(86) 60(0.6)  1349(12.6)   OUTPUT   Shift Total(mL/kg)       Weight (kg) 107.2 107.2 107.2 107.2     Patient Vitals for the past 96 hrs (Last 3 readings):   Weight   20 0934 236 lb 6.4 oz (107.2 kg)   20 2147 231 lb (104.8 kg)         Drains/Tubes Outputs  n/a  Exam         PHYSICAL EXAM:    General appearance - alert, well appearing, oriented to person, place, and time. On the BiPAP machine. Eyes are closed when I enter the room. When I call her name she wakes up and answers questions appropriately.   Mental status - alert, oriented to person, place, and time, normal mood, behavior, speech, dress, Ativan after she became more agitated and at that point became more somnolent requiring increased oxygen needs. Currently somnolent but awakens to my voice; answering questions and following commands on the BiPAP. CT of the head without contrast was negative in the emergency permit for acute intracranial abnormality. Likely somnolent secondary to polypharmacy versus medication effect versus illicit drug use. Respiratory   Acute respiratory failure with hypoxia. Initially hypoxic on arrival secondary to accidental drug overdose and possible secondary aspiration pneumonia. Initial ABG in the emergency department showed her with respiratory acidosis was placed on nasal cannula and had improvement in her ABG after the first day of her admission. Respiratory status worsened last night after she was given Ativan and she became increasingly hypoxic eventually requiring BiPAP and therefore transferred to the ICU for further monitoring of her respiratory status. At this point tolerating BiPAP with oxygen saturation 91%, awake, answering questions appropriately. Does not not need intubation at this time continue to monitor however her respiratory status. Trend ABG. Received Levaquin and Unasyn for possible pneumonia. Chest x-ray showed possible pulmonary edema. Also received 1 dose of Lasix. May be a component of COPD given her smoking history. Wean oxygen as tolerated. Keep O2 sat 90-92%    Cardiovascular   Chest x-ray shows signs of pulmonary edema. Echo obtained that showed ejection fraction 23% with no diastolic failure. Right ventricular systolic pressures 90MROP, mild/moderate. May have NELLIE that is undiagnosed. Less likely cardiogenic etiology of her pulmonary edema at this point. Question if tachycardia may also be related to withdrawal.     Gastrointestinal   Carb controlled diet. Renal   Normal renal function with creatinine of 0.6. Continue to trend.        Infectious Disease   Currently

## 2020-03-06 ENCOUNTER — APPOINTMENT (OUTPATIENT)
Dept: GENERAL RADIOLOGY | Age: 50
DRG: 812 | End: 2020-03-06
Payer: MEDICAID

## 2020-03-06 ENCOUNTER — APPOINTMENT (OUTPATIENT)
Dept: CT IMAGING | Age: 50
DRG: 812 | End: 2020-03-06
Payer: MEDICAID

## 2020-03-06 LAB
ALBUMIN SERPL-MCNC: 3 G/DL (ref 3.5–5.2)
ALP BLD-CCNC: 85 U/L (ref 35–104)
ALT SERPL-CCNC: 16 U/L (ref 0–32)
ANION GAP SERPL CALCULATED.3IONS-SCNC: 13 MMOL/L (ref 7–16)
AST SERPL-CCNC: 29 U/L (ref 0–31)
BILIRUB SERPL-MCNC: 0.4 MG/DL (ref 0–1.2)
BUN BLDV-MCNC: 4 MG/DL (ref 6–20)
CALCIUM SERPL-MCNC: 8.8 MG/DL (ref 8.6–10.2)
CHLORIDE BLD-SCNC: 92 MMOL/L (ref 98–107)
CO2: 27 MMOL/L (ref 22–29)
CREAT SERPL-MCNC: 0.5 MG/DL (ref 0.5–1)
D DIMER: 1851 NG/ML DDU
GFR AFRICAN AMERICAN: >60
GFR NON-AFRICAN AMERICAN: >60 ML/MIN/1.73
GLUCOSE BLD-MCNC: 241 MG/DL (ref 74–99)
HCT VFR BLD CALC: 31.5 % (ref 34–48)
HEMOGLOBIN: 10.2 G/DL (ref 11.5–15.5)
MAGNESIUM: 1.7 MG/DL (ref 1.6–2.6)
MCH RBC QN AUTO: 29.9 PG (ref 26–35)
MCHC RBC AUTO-ENTMCNC: 32.4 % (ref 32–34.5)
MCV RBC AUTO: 92.4 FL (ref 80–99.9)
METER GLUCOSE: 226 MG/DL (ref 74–99)
METER GLUCOSE: 232 MG/DL (ref 74–99)
METER GLUCOSE: 244 MG/DL (ref 74–99)
METER GLUCOSE: 247 MG/DL (ref 74–99)
PDW BLD-RTO: 13.2 FL (ref 11.5–15)
PHOSPHORUS: 2.3 MG/DL (ref 2.5–4.5)
PLATELET # BLD: 158 E9/L (ref 130–450)
PMV BLD AUTO: 11.3 FL (ref 7–12)
POTASSIUM SERPL-SCNC: 3.9 MMOL/L (ref 3.5–5)
RBC # BLD: 3.41 E12/L (ref 3.5–5.5)
SODIUM BLD-SCNC: 132 MMOL/L (ref 132–146)
TOTAL PROTEIN: 7.1 G/DL (ref 6.4–8.3)
URINE CULTURE, ROUTINE: NORMAL
WBC # BLD: 12.1 E9/L (ref 4.5–11.5)

## 2020-03-06 PROCEDURE — 2580000003 HC RX 258: Performed by: INTERNAL MEDICINE

## 2020-03-06 PROCEDURE — 71275 CT ANGIOGRAPHY CHEST: CPT

## 2020-03-06 PROCEDURE — 99232 SBSQ HOSP IP/OBS MODERATE 35: CPT | Performed by: INTERNAL MEDICINE

## 2020-03-06 PROCEDURE — 6360000002 HC RX W HCPCS: Performed by: INTERNAL MEDICINE

## 2020-03-06 PROCEDURE — 6370000000 HC RX 637 (ALT 250 FOR IP): Performed by: INTERNAL MEDICINE

## 2020-03-06 PROCEDURE — 6370000000 HC RX 637 (ALT 250 FOR IP): Performed by: NURSE PRACTITIONER

## 2020-03-06 PROCEDURE — 36415 COLL VENOUS BLD VENIPUNCTURE: CPT

## 2020-03-06 PROCEDURE — 84100 ASSAY OF PHOSPHORUS: CPT

## 2020-03-06 PROCEDURE — 71045 X-RAY EXAM CHEST 1 VIEW: CPT

## 2020-03-06 PROCEDURE — 2700000000 HC OXYGEN THERAPY PER DAY

## 2020-03-06 PROCEDURE — 94660 CPAP INITIATION&MGMT: CPT

## 2020-03-06 PROCEDURE — 85027 COMPLETE CBC AUTOMATED: CPT

## 2020-03-06 PROCEDURE — 6360000004 HC RX CONTRAST MEDICATION: Performed by: RADIOLOGY

## 2020-03-06 PROCEDURE — 2580000003 HC RX 258: Performed by: NURSE PRACTITIONER

## 2020-03-06 PROCEDURE — 85378 FIBRIN DEGRADE SEMIQUANT: CPT

## 2020-03-06 PROCEDURE — 80053 COMPREHEN METABOLIC PANEL: CPT

## 2020-03-06 PROCEDURE — 83735 ASSAY OF MAGNESIUM: CPT

## 2020-03-06 PROCEDURE — 2060000000 HC ICU INTERMEDIATE R&B

## 2020-03-06 PROCEDURE — 82962 GLUCOSE BLOOD TEST: CPT

## 2020-03-06 RX ORDER — FUROSEMIDE 10 MG/ML
40 INJECTION INTRAMUSCULAR; INTRAVENOUS ONCE
Status: COMPLETED | OUTPATIENT
Start: 2020-03-06 | End: 2020-03-06

## 2020-03-06 RX ORDER — TRAMADOL HYDROCHLORIDE 50 MG/1
50 TABLET ORAL EVERY 6 HOURS PRN
Status: DISCONTINUED | OUTPATIENT
Start: 2020-03-06 | End: 2020-03-09 | Stop reason: HOSPADM

## 2020-03-06 RX ADMIN — GABAPENTIN 300 MG: 300 CAPSULE ORAL at 20:54

## 2020-03-06 RX ADMIN — TRIAMTERENE AND HYDROCHLOROTHIAZIDE 1 TABLET: 37.5; 25 TABLET ORAL at 08:47

## 2020-03-06 RX ADMIN — TRAMADOL HYDROCHLORIDE 50 MG: 50 TABLET, FILM COATED ORAL at 16:57

## 2020-03-06 RX ADMIN — AMPICILLIN SODIUM AND SULBACTAM SODIUM 3 G: 2; 1 INJECTION, POWDER, FOR SOLUTION INTRAMUSCULAR; INTRAVENOUS at 13:14

## 2020-03-06 RX ADMIN — SODIUM CHLORIDE, PRESERVATIVE FREE 10 ML: 5 INJECTION INTRAVENOUS at 08:47

## 2020-03-06 RX ADMIN — ACETAMINOPHEN 650 MG: 325 TABLET ORAL at 10:40

## 2020-03-06 RX ADMIN — GABAPENTIN 300 MG: 300 CAPSULE ORAL at 13:52

## 2020-03-06 RX ADMIN — Medication 10 ML: at 15:50

## 2020-03-06 RX ADMIN — INSULIN LISPRO 4 UNITS: 100 INJECTION, SOLUTION INTRAVENOUS; SUBCUTANEOUS at 16:52

## 2020-03-06 RX ADMIN — INSULIN LISPRO 2 UNITS: 100 INJECTION, SOLUTION INTRAVENOUS; SUBCUTANEOUS at 21:02

## 2020-03-06 RX ADMIN — FUROSEMIDE 40 MG: 10 INJECTION, SOLUTION INTRAMUSCULAR; INTRAVENOUS at 10:00

## 2020-03-06 RX ADMIN — AMPICILLIN SODIUM AND SULBACTAM SODIUM 3 G: 2; 1 INJECTION, POWDER, FOR SOLUTION INTRAMUSCULAR; INTRAVENOUS at 19:14

## 2020-03-06 RX ADMIN — Medication 10 ML: at 15:30

## 2020-03-06 RX ADMIN — GABAPENTIN 300 MG: 300 CAPSULE ORAL at 08:46

## 2020-03-06 RX ADMIN — AMPICILLIN SODIUM AND SULBACTAM SODIUM 3 G: 2; 1 INJECTION, POWDER, FOR SOLUTION INTRAMUSCULAR; INTRAVENOUS at 01:00

## 2020-03-06 RX ADMIN — IOPAMIDOL 80 ML: 755 INJECTION, SOLUTION INTRAVENOUS at 17:54

## 2020-03-06 RX ADMIN — INSULIN LISPRO 4 UNITS: 100 INJECTION, SOLUTION INTRAVENOUS; SUBCUTANEOUS at 11:45

## 2020-03-06 RX ADMIN — AMPICILLIN SODIUM AND SULBACTAM SODIUM 3 G: 2; 1 INJECTION, POWDER, FOR SOLUTION INTRAMUSCULAR; INTRAVENOUS at 06:56

## 2020-03-06 RX ADMIN — ENOXAPARIN SODIUM 40 MG: 40 INJECTION SUBCUTANEOUS at 08:46

## 2020-03-06 RX ADMIN — ATORVASTATIN CALCIUM 40 MG: 40 TABLET, FILM COATED ORAL at 08:45

## 2020-03-06 RX ADMIN — SERTRALINE HYDROCHLORIDE 50 MG: 50 TABLET ORAL at 08:47

## 2020-03-06 RX ADMIN — INSULIN LISPRO 4 UNITS: 100 INJECTION, SOLUTION INTRAVENOUS; SUBCUTANEOUS at 07:43

## 2020-03-06 RX ADMIN — SODIUM CHLORIDE, PRESERVATIVE FREE 10 ML: 5 INJECTION INTRAVENOUS at 20:55

## 2020-03-06 ASSESSMENT — PAIN SCALES - GENERAL
PAINLEVEL_OUTOF10: 5
PAINLEVEL_OUTOF10: 2
PAINLEVEL_OUTOF10: 0
PAINLEVEL_OUTOF10: 6
PAINLEVEL_OUTOF10: 3
PAINLEVEL_OUTOF10: 0
PAINLEVEL_OUTOF10: 0

## 2020-03-06 NOTE — CARE COORDINATION
Met briefly with pt's daughters along with Lele Alejo. Pt lives w/ her children in a 2 story house. Independent PTA. Currently on O2 4l NC- Bipap used during night-does not wear home O2. Will need O2 testing prior to discharge to assess home need. Unable to address issue of cocaine identified in drug screen d/t younger child in room with patient. Unable to complete assessment d/t nursing needs. Currently on iv abx. Recommend PT/OT eval when appropriate to assist in discharge planning .  Will follow Zaire Amador

## 2020-03-06 NOTE — PROGRESS NOTES
[] pending)    VASOPRESSORS:  [x] No    [] Yes    If yes -   [] Levophed       [] Dopamine     [] Vasopressin       [] Dobutamine  [] Phenylephrine         [] Epinephrine    CENTRAL LINES:     [x] No   [] Yes   (Date of Insertion:   )           If yes -     [] Right IJ     [] Left IJ [] Right Femoral [] Left Femoral                   [] Right Subclavian [] Left Subclavian       GARNETT'S CATHETER:   [x] No   [] Yes  (Date of Insertion:   )     URINE OUTPUT:            [x] Good   [] Low              [] Anuric      OBJECTIVE:     VITAL SIGNS:  BP (!) 118/56   Pulse 109   Temp 98.4 °F (36.9 °C) (Oral)   Resp (!) 48   Ht 5' (1.524 m)   Wt 236 lb 8 oz (107.3 kg)   SpO2 100%   BMI 46.19 kg/m²   Tmax over 24 hours:  Temp (24hrs), Av.3 °F (36.8 °C), Min:97.8 °F (36.6 °C), Max:98.8 °F (37.1 °C)      Patient Vitals for the past 6 hrs:   BP Temp Temp src Pulse Resp SpO2   20 1140 -- 98.4 °F (36.9 °C) Oral -- -- 100 %   20 1000 (!) 118/56 -- -- 109 (!) 48 --   20 0900 114/67 -- -- 124 (!) 61 --   20 0800 97/ 97.8 °F (36.6 °C) Axillary 114 (!) 34 --   20 0759 97/67 -- -- 115 (!) 33 --         Intake/Output Summary (Last 24 hours) at 3/6/2020 1346  Last data filed at 3/6/2020 1200  Gross per 24 hour   Intake 2251 ml   Output 2260 ml   Net -9 ml     Wt Readings from Last 2 Encounters:   20 236 lb 8 oz (107.3 kg)   20 231 lb 3.2 oz (104.9 kg)     Body mass index is 46.19 kg/m². PHYSICAL EXAMINATION:    General appearance - alert, well appearing, and in no distress and oriented to person, place, and time. On nasal cannula oxygen.   Mental status - alert, oriented to person, place, and time, normal mood, behavior, speech, dress, motor activity, and thought processes  Eyes - pupils equal and reactive, extraocular eye movements intact, sclera anicteric  Ears - external ear normal  Nose - normal and patent, no erythema, discharge or polyps  Mouth - mucous membranes moist, pharynx normal without lesions  Neck - supple, no significant adenopathy  Chest -decreased breath sounds throughout, there are no rales or rhonchi, symmetric air entry. Speaking in full sentences on 4 L nasal cannula supplemental oxygen. Heart -tachycardic, regular rhythm, normal S1, S2, no murmurs, rubs, clicks or gallops  Abdomen - soft, nontender, nondistended, no masses or organomegaly  Neurological - alert, oriented, normal speech, no focal findings or movement disorder noted  Extremities - peripheral pulses normal, no clubbing or cyanosis. No edema.   Skin - normal coloration and turgor, no rashes, no suspicious skin lesions noted      Any additional physical findings:    MEDICATIONS:    Scheduled Meds:   enoxaparin  40 mg Subcutaneous Daily    atorvastatin  40 mg Oral Daily    gabapentin  300 mg Oral TID    sertraline  50 mg Oral Daily    triamterene-hydrochlorothiazide  1 tablet Oral Daily    sodium chloride flush  10 mL Intravenous 2 times per day    nicotine  1 patch Transdermal Daily    insulin lispro  0-12 Units Subcutaneous TID WC    insulin lispro  0-6 Units Subcutaneous Nightly    ampicillin-sulbactam  3 g Intravenous Q6H     Continuous Infusions:   dextrose       PRN Meds:   traMADol, 50 mg, Q6H PRN  sodium chloride flush, 10 mL, PRN  acetaminophen, 650 mg, Q6H PRN    Or  acetaminophen, 650 mg, Q6H PRN  polyethylene glycol, 17 g, Daily PRN  promethazine, 12.5 mg, Q6H PRN    Or  ondansetron, 4 mg, Q6H PRN  levalbuterol, 1.25 mg, Q4H PRN  sodium chloride nebulizer, 3 mL, PRN  glucose, 15 g, PRN  dextrose, 12.5 g, PRN  glucagon (rDNA), 1 mg, PRN  dextrose, 100 mL/hr, PRN  perflutren lipid microspheres, 1.5 mL, ONCE PRN          VENT SETTINGS (Comprehensive) (if applicable):  Vent Information  Skin Assessment: Clean, dry, & intact  Vt Ordered: 500 mL  FiO2 : 50 %  Additional Respiratory  Assessments  Pulse: 109  Resp: (!) 48  SpO2: 100 %  Oral Care: Mouth moisturizer    ABGs:   ABG from March 5, 2020 at 1536 shows pH of 7.341. PCO2 of 50.0. PO2 of 79.6. Bicarb 26.4. Laboratory findings:    Complete Blood Count:   Recent Labs     03/04/20 0406 03/05/20 0338 03/06/20  0510   WBC 9.1 9.3 12.1*   HGB 13.8 11.9 10.2*   HCT 43.3 37.3 31.5*    169 158        Last 3 Blood Glucose:   Recent Labs     03/04/20 0406 03/05/20 0325 03/06/20  0510   GLUCOSE 287* 195* 241*        PT/INR:    Lab Results   Component Value Date    PROTIME 10.7 03/15/2012    INR 1.1 03/15/2012     PTT:    Lab Results   Component Value Date    APTT 27.2 03/15/2012       Comprehensive Metabolic Profile:   Recent Labs     03/04/20 0406 03/05/20 0325 03/06/20  0510     --  131* 132   K 5.0  --  3.9 3.9   CL 98  --  96* 92*   CO2 23  --  23 27   BUN 14  --  12 4*   CREATININE 0.6  --  0.6 0.5   GLUCOSE 287*  --  195* 241*   CALCIUM 8.6  --  8.8 8.8   PROT  --    < > 7.8 7.1   LABALBU  --    < > 3.4* 3.0*   BILITOT  --    < > 0.3 0.4   ALKPHOS  --    < > 77 85   AST  --    < > 22 29   ALT  --    < > 15 16    < > = values in this interval not displayed.       Magnesium:   Lab Results   Component Value Date    MG 1.7 03/06/2020     Phosphorus:   Lab Results   Component Value Date    PHOS 2.3 03/06/2020     Ionized Calcium: No results found for: CAION     Urinalysis:     Troponin:   Recent Labs     03/04/20 0406   TROPONINI <0.01       Microbiology:    Cultures during this admission:     Blood cultures:                 [] None drawn      [x] Negative             []  Positive (Details:  )  Urine Culture:     Pending              [] None drawn      [] Negative             []  Positive (Details:  )  Sputum Culture:               [x] None drawn       [] Negative             []  Positive (Details:  )   Endotracheal aspirate:     [x] None drawn       [] Negative             []  Positive (Details:  )   Respiratory panel negative  Other pertinent Labs:       Radiology/Imaging:   Ct There is no evidence of pleural effusion. The pulmonary vascularity is unremarkable. The cardiac, hilar and mediastinal silhouettes are satisfactory. The bony thorax demonstrates no gross abnormality. Diffuse infiltrates in both lungs suggesting of interstitial pulmonary edema which appears to be unchanged from prior study. Xr Chest Portable    Result Date: 3/5/2020  Patient MRN: [id number] : 1970 Age:  47 years Gender: Female Order Date: 3/5/2020 6:00 AM Exam: XR CHEST PORTABLE Number of Images: 1 view Indication:   asp pna asp pna Comparison: Prior chest radiograph from 2012 is available. Findings: Study demonstrate a cardiac silhouettes to be within the limits of normal. There is ill-defined infiltrate seen in both lung with CT-guided also confirmed from 2020. There is no evidence of any pleural effusion. The bony thorax demonstrate no gross abnormality     Ill-defined infiltrate seen in both lung which was seen before and appears to be worsening. Xr Chest Portable    Result Date: 3/4/2020  Patient MRN:  [id number] : 1970 Age: 47 years Gender: Female Order Date:  3/4/2020 3:45 AM EXAM: XR CHEST PORTABLE one image INDICATION:  Hypoxemia Hypoxemia COMPARISON: None FINDINGS: There is low lung volumes with borderline cardiac size. There is patchy right perihilar infiltrates extending to right lung base. Small left basilar infiltrate is also suspected. Borderline cardiac size with the perihilar and bibasilar infiltrates which may be due to pneumonia or developing CHF. Chest Xray (3/6/2020):    ASSESSMENT:     1. Acute respiratory failure with hypoxia requiring positive pressure ventilation  2. Non cardiogenic pulmonary edema  3. Possible aspiration pneumonia secondary to overdose, unresponsiveness-given narcan  4. Accidental drug overdose   5. Active tobacco abuse 1/2 ppd   6. Likely COPD, chronic bronchitis not in acute exacerbation   7. Type 2 diabetes  8.  Chronic back pain on chronic pain medication at home  9. Obesity,  probable NELLIE   10. HTN        PLAN:   Acute respiratory failure with hypoxia: Patient initially transferred to the ICU on BiPAP with concerns of acute respiratory distress requiring positive ventilation. Overnight she tolerated the BiPAP without difficulty and her ABG normalized. Was able to be transitioned to nasal cannula this morning without difficulty. Patient remains tachycardic. She is not normally on nasal cannula oxygen at home. Is possible that her hypoxia may be from inhalation injury secondary to cocaine use. D-dimer was elevated. CT scan with contrast of the chest was obtained to evaluate for possible pulmonary embolism contributing to her acute respiratory condition. She is also being treated for aspiration pneumonia with Unasyn. We will continue to monitor her respiratory status closely. There also may be a component of COPD given her smoking history. Her acute respiratory episode in the hospital may have also been related to polypharmacy. She should not receive benzodiazepines as this may have caused her initial respiratory depression. Noncardiogenic pulmonary edema: Received 1 dose of Lasix. She had adequate urinary output overnight. No signs of obvious fluid overload. Chest x-ray appears mildly improved today. May have underlying NELLIE. Unlikely cardiac in nature. Started on maxzide. Pneumonia versus aspiration pneumonia: Currently on Unasyn to be treated for possible aspiration pneumonia. There may also be an element of inhalation injury. Respiratory culture pending. Accidental drug overdose: Tox screen positive for opioids as well as cocaine. Upon further clarification the patient has not been on opioid pain medications that were prescribed for at least 1 year. She has only been on tramadol and gabapentin at home. Likely accidental ingestion. Type 2 diabetes: Continue sliding scale.       WEAN PER PROTOCOL:  [] No   [x] Yes  [] N/A    DISCONTINUE ANY LABS:   [x] No   [] Yes    ICU PROPHYLAXIS:  Stress ulcer:  [] PPI Agent  [] [id number] [] Sucralfate  [] Other:  VTE:   [] Enoxaparin  [] Unfract. Heparin Subcut  [] EPC Cuffs    NUTRITION:  [] NPO [] Tube Feeding (Specify: ) [] TPN  [x] PO (Diet: DIET CARB CONTROL; Carb Control: 4 carb choices (60 gms)/meal)    HOME MEDICATIONS RECONCILED: [] No  [x] Yes    INSULIN DRIP:   [x] No   [] Yes    CONSULTATION NEEDED:  [x] No   [] Yes    FAMILY UPDATED:    [] No   [x] Yes    TRANSFER OUT OF ICU:   [x] No   [] Yes    ADDITIONAL PLAN:      Vlad Gregg DO, PGY1.                       3/6/2020, 1:46 PM    I personally saw, examined and provided care for the patient. Radiographs, labs and medication list were reviewed by me independently. I spoke with bedside nursing, therapists and consultants. Critical care services and times documented are independent of procedures and multidisciplinary rounds with Residents. Additionally comprehensive, multidisciplinary rounds were conducted with the MICU team. The case was discussed in detail and plans for care were established. Review of Residents documentation was conducted and revisions were made as appropriate. I agree with the above documented exam, problem list and plan of care.   Elizabeth Colon MD   CCT excluding procedures:38'

## 2020-03-06 NOTE — PROGRESS NOTES
Date: 3/6/2020    Time: 0105    Patient Placed On BIPAP/CPAP/ Non-Invasive Ventilation? Yes    If no must comment. Facial area red/color change? No           If YES are Blister/Lesion present? No   If yes must notify nursing staff  BIPAP/CPAP skin barrier? Yes    Skin barrier type:duoderm       Comments:   In red outlet      Keyana Nicole

## 2020-03-06 NOTE — PROGRESS NOTES
symmetry between   findings on the right and left side. ALERT:  ABNORMAL REPORT. XR CHEST PORTABLE   Final Result   Borderline cardiac size with the perihilar and bibasilar infiltrates   which may be due to pneumonia or developing CHF. Assessment:    Principal Problem:    Noncardiogenic pulmonary edema  Active Problems:    HTN (hypertension)    Type 2 diabetes mellitus (HCC)    Bronchitis    Pneumonia    Tobacco abuse    History of alcohol use    Drug overdose    Acute respiratory failure with hypoxia and hypercapnia (HCC)    Acute pulmonary edema (HCC)  Resolved Problems:    * No resolved hospital problems. *      Plan:  Acute respiratory failure with hypoxia/hypercapnia- was  on BiPAP now on NC 4 L . Improving overall. May have NELLIE. She will need sleep studies as outpatient. May have COPD-outpatient follow-up with pulmonary. Possible aspiration pneumonia versus pneumonitis -on IV Unasyn,  follow cultures , CT chest reviewed, will need outpatient follow-up with pulmonary/PFTs. Cocaine use/fentanyl use/accidental overdose,monitor. Noncardiogenic pulmonary edema versus aspiration pneumonitis-plan as above pulmonary following. Type 2 diabetes uncontrolled -on sliding scale    Hypertension -resume as per home, monitor. Hyperlipidemia- on statins    Chronic low back pain-on Neurontin,  Zanaflex and tramadol was on hold ,noted tramadol resumed. Tobacco use-  has been counseled for cessation and is on nicotine patch.     On subcu Lovenox for DVT prophylaxis      Melissa Duke MD       Electronically signed by Melissa Duke MD on 3/7/2020 at 3:41 AM

## 2020-03-07 LAB
ANION GAP SERPL CALCULATED.3IONS-SCNC: 12 MMOL/L (ref 7–16)
BUN BLDV-MCNC: 4 MG/DL (ref 6–20)
CALCIUM SERPL-MCNC: 8.6 MG/DL (ref 8.6–10.2)
CHLORIDE BLD-SCNC: 89 MMOL/L (ref 98–107)
CO2: 29 MMOL/L (ref 22–29)
CREAT SERPL-MCNC: 0.4 MG/DL (ref 0.5–1)
GFR AFRICAN AMERICAN: >60
GFR NON-AFRICAN AMERICAN: >60 ML/MIN/1.73
GLUCOSE BLD-MCNC: 229 MG/DL (ref 74–99)
HCT VFR BLD CALC: 30.7 % (ref 34–48)
HEMOGLOBIN: 9.9 G/DL (ref 11.5–15.5)
MCH RBC QN AUTO: 29.2 PG (ref 26–35)
MCHC RBC AUTO-ENTMCNC: 32.2 % (ref 32–34.5)
MCV RBC AUTO: 90.6 FL (ref 80–99.9)
METER GLUCOSE: 148 MG/DL (ref 74–99)
METER GLUCOSE: 188 MG/DL (ref 74–99)
METER GLUCOSE: 328 MG/DL (ref 74–99)
MRSA CULTURE ONLY: NORMAL
PDW BLD-RTO: 13 FL (ref 11.5–15)
PLATELET # BLD: 189 E9/L (ref 130–450)
PMV BLD AUTO: 11.5 FL (ref 7–12)
POTASSIUM SERPL-SCNC: 3.4 MMOL/L (ref 3.5–5)
RBC # BLD: 3.39 E12/L (ref 3.5–5.5)
SODIUM BLD-SCNC: 130 MMOL/L (ref 132–146)
WBC # BLD: 11.8 E9/L (ref 4.5–11.5)

## 2020-03-07 PROCEDURE — 2580000003 HC RX 258: Performed by: INTERNAL MEDICINE

## 2020-03-07 PROCEDURE — 36415 COLL VENOUS BLD VENIPUNCTURE: CPT

## 2020-03-07 PROCEDURE — 6360000002 HC RX W HCPCS: Performed by: INTERNAL MEDICINE

## 2020-03-07 PROCEDURE — 6370000000 HC RX 637 (ALT 250 FOR IP): Performed by: INTERNAL MEDICINE

## 2020-03-07 PROCEDURE — 99232 SBSQ HOSP IP/OBS MODERATE 35: CPT | Performed by: INTERNAL MEDICINE

## 2020-03-07 PROCEDURE — 85027 COMPLETE CBC AUTOMATED: CPT

## 2020-03-07 PROCEDURE — 2700000000 HC OXYGEN THERAPY PER DAY

## 2020-03-07 PROCEDURE — 2060000000 HC ICU INTERMEDIATE R&B

## 2020-03-07 PROCEDURE — 94660 CPAP INITIATION&MGMT: CPT

## 2020-03-07 PROCEDURE — 82962 GLUCOSE BLOOD TEST: CPT

## 2020-03-07 PROCEDURE — 80048 BASIC METABOLIC PNL TOTAL CA: CPT

## 2020-03-07 RX ADMIN — ENOXAPARIN SODIUM 40 MG: 40 INJECTION SUBCUTANEOUS at 07:48

## 2020-03-07 RX ADMIN — AMPICILLIN SODIUM AND SULBACTAM SODIUM 3 G: 2; 1 INJECTION, POWDER, FOR SOLUTION INTRAMUSCULAR; INTRAVENOUS at 06:45

## 2020-03-07 RX ADMIN — INSULIN LISPRO 2 UNITS: 100 INJECTION, SOLUTION INTRAVENOUS; SUBCUTANEOUS at 11:37

## 2020-03-07 RX ADMIN — AMPICILLIN SODIUM AND SULBACTAM SODIUM 3 G: 2; 1 INJECTION, POWDER, FOR SOLUTION INTRAMUSCULAR; INTRAVENOUS at 12:37

## 2020-03-07 RX ADMIN — AMPICILLIN SODIUM AND SULBACTAM SODIUM 3 G: 2; 1 INJECTION, POWDER, FOR SOLUTION INTRAMUSCULAR; INTRAVENOUS at 00:45

## 2020-03-07 RX ADMIN — SERTRALINE HYDROCHLORIDE 50 MG: 50 TABLET ORAL at 07:47

## 2020-03-07 RX ADMIN — INSULIN LISPRO 8 UNITS: 100 INJECTION, SOLUTION INTRAVENOUS; SUBCUTANEOUS at 17:16

## 2020-03-07 RX ADMIN — INSULIN LISPRO 1 UNITS: 100 INJECTION, SOLUTION INTRAVENOUS; SUBCUTANEOUS at 19:55

## 2020-03-07 RX ADMIN — INSULIN LISPRO 4 UNITS: 100 INJECTION, SOLUTION INTRAVENOUS; SUBCUTANEOUS at 07:40

## 2020-03-07 RX ADMIN — TRAMADOL HYDROCHLORIDE 50 MG: 50 TABLET, FILM COATED ORAL at 17:13

## 2020-03-07 RX ADMIN — SODIUM CHLORIDE, PRESERVATIVE FREE 10 ML: 5 INJECTION INTRAVENOUS at 19:56

## 2020-03-07 RX ADMIN — AMPICILLIN SODIUM AND SULBACTAM SODIUM 3 G: 2; 1 INJECTION, POWDER, FOR SOLUTION INTRAMUSCULAR; INTRAVENOUS at 19:15

## 2020-03-07 RX ADMIN — GABAPENTIN 300 MG: 300 CAPSULE ORAL at 07:47

## 2020-03-07 RX ADMIN — TRIAMTERENE AND HYDROCHLOROTHIAZIDE 1 TABLET: 37.5; 25 TABLET ORAL at 07:47

## 2020-03-07 RX ADMIN — ATORVASTATIN CALCIUM 40 MG: 40 TABLET, FILM COATED ORAL at 07:46

## 2020-03-07 RX ADMIN — GABAPENTIN 300 MG: 300 CAPSULE ORAL at 13:14

## 2020-03-07 RX ADMIN — SODIUM CHLORIDE, PRESERVATIVE FREE 10 ML: 5 INJECTION INTRAVENOUS at 11:37

## 2020-03-07 ASSESSMENT — PAIN SCALES - GENERAL
PAINLEVEL_OUTOF10: 0
PAINLEVEL_OUTOF10: 0
PAINLEVEL_OUTOF10: 9
PAINLEVEL_OUTOF10: 0
PAINLEVEL_OUTOF10: 0

## 2020-03-07 ASSESSMENT — PAIN DESCRIPTION - PAIN TYPE: TYPE: CHRONIC PAIN

## 2020-03-07 ASSESSMENT — PAIN DESCRIPTION - ORIENTATION: ORIENTATION: MID

## 2020-03-07 ASSESSMENT — PAIN DESCRIPTION - ONSET: ONSET: ON-GOING

## 2020-03-07 ASSESSMENT — PAIN DESCRIPTION - DESCRIPTORS: DESCRIPTORS: ACHING;DISCOMFORT

## 2020-03-07 ASSESSMENT — PAIN DESCRIPTION - LOCATION: LOCATION: BACK

## 2020-03-07 ASSESSMENT — PAIN DESCRIPTION - FREQUENCY: FREQUENCY: CONTINUOUS

## 2020-03-07 NOTE — PROGRESS NOTES
CREATININE 0.6 0.5 0.4*   GLUCOSE 195* 241* 229*   CALCIUM 8.8 8.8 8.6       Recent Labs     03/05/20  0338 03/06/20  0510 03/07/20  0449   WBC 9.3 12.1* 11.8*   RBC 4.07 3.41* 3.39*   HGB 11.9 10.2* 9.9*   HCT 37.3 31.5* 30.7*   MCV 91.6 92.4 90.6   MCH 29.2 29.9 29.2   MCHC 31.9* 32.4 32.2   RDW 13.5 13.2 13.0    158 189   MPV 11.7 11.3 11.5       CBC:   Lab Results   Component Value Date    WBC 11.8 03/07/2020    RBC 3.39 03/07/2020    HGB 9.9 03/07/2020    HCT 30.7 03/07/2020    MCV 90.6 03/07/2020    MCH 29.2 03/07/2020    MCHC 32.2 03/07/2020    RDW 13.0 03/07/2020     03/07/2020    MPV 11.5 03/07/2020     BMP:    Lab Results   Component Value Date     03/07/2020    K 3.4 03/07/2020    K 3.9 03/05/2020    CL 89 03/07/2020    CO2 29 03/07/2020    BUN 4 03/07/2020    LABALBU 3.0 03/06/2020    LABALBU 3.8 03/15/2012    CREATININE 0.4 03/07/2020    CALCIUM 8.6 03/07/2020    GFRAA >60 03/07/2020    LABGLOM >60 03/07/2020    GLUCOSE 229 03/07/2020    GLUCOSE 124 03/15/2012        Radiology:   CTA PULMONARY W CONTRAST   Final Result      Bilateral patchy groundglass infiltrates in the perihilar and   perivascular distributions could reflect infection, aspiration or   volume overload. Mediastinal and bilateral hilar lymphadenopathy. No evidence for pulmonary embolism or aortic dissection. XR CHEST PORTABLE   Final Result      Diffuse infiltrates in both lungs suggesting of interstitial pulmonary   edema which appears to be unchanged from prior study. XR CHEST PORTABLE   Final Result      Ill-defined infiltrate seen in both lung which was seen before and   appears to be worsening. CT Chest WO Contrast   Final Result   1. Extensive bilateral lung infiltrates. The findings to be correlate   clinically. Pneumonia including opportunistic infection and   noncardiogenic pulmonary edema are part of the differential diagnosis.       2. Bronchocentric infiltrates to the lungs not extending into the   pleural surface can be manifestation of arteriolitis/pulmonary   hemorrhage complex, particularly considering the symmetry between   findings on the right and left side. ALERT:  ABNORMAL REPORT. XR CHEST PORTABLE   Final Result   Borderline cardiac size with the perihilar and bibasilar infiltrates   which may be due to pneumonia or developing CHF. Assessment:    Principal Problem:    Noncardiogenic pulmonary edema  Active Problems:    HTN (hypertension)    Type 2 diabetes mellitus (HCC)    Bronchitis    Pneumonia    Tobacco abuse    History of alcohol use    Drug overdose    Acute respiratory failure with hypoxia and hypercapnia (HCC)    Acute pulmonary edema (HCC)  Resolved Problems:    * No resolved hospital problems.  *      Plan:  Acute respiratory failure with hypoxia and hypercapnia wean oxygen as able  Probable aspiration pneumonia continue abx  Hyponatremia monitor  Hypokalemia monitor   Hypophosphatemia monitor   Cocaine use rec cessation  Fentanyl use rec cessation  Pulmonary edema monitor  Dm type 2 uncontrolled monitor bs and tx with insulin  htn monitor bp and tx  Hyperlipidemia continue med        Electronically signed by Nate Terrell DO on 3/7/2020 at 5:07 PM

## 2020-03-07 NOTE — PROGRESS NOTES
Date: 3/6/2020    Time: 10:47 PM    Patient Placed On BIPAP/CPAP/ Non-Invasive Ventilation?   No    If no must comment    Comments: patient refused to wear bipap        Fabi Leblanc

## 2020-03-07 NOTE — PROGRESS NOTES
Bubba Montesinos M.D.,[facility]  Ramu James D.O., F.A.C.O.I., Lukas Warren M.D. Skyler Segundo M.D., Dallas Esteves M.D. Deepa Quinn D.O. Daily Pulmonary Progress Note    Patient:  Darius Elkins 47 y.o. female MRN: [id number]     Date of Service: 3/7/2020      Synopsis     We are following patient for acute hypoxic and hypercapnic respiratory failure    \"CC\" ; with mental status    Code status: Full code      Subjective      Patient was seen and examined. Laying in bed in no distress. Saturating 100% on 2 L of oxygen. Refused her noninvasive ventilator last night. Review of Systems:  Constitutional: Denies fever, weight loss, night sweats, and fatigue  Skin: Denies pigmentation, dark lesions, and rashes   HEENT: Denies hearing loss, tinnitus, ear drainage, epistaxis, sore throat, and hoarseness. Cardiovascular: Denies palpitations, chest pain, and chest pressure. Respiratory: Denies cough, dyspnea at rest, hemoptysis, apnea, and choking.   Gastrointestinal: Denies nausea, vomiting, poor appetite, diarrhea, heartburn or reflux  Genitourinary: Denies dysuria, frequency, urgency or hematuria  Musculoskeletal: Denies myalgias, muscle weakness, and bone pain  Neurological: Denies dizziness, vertigo, headache, and focal weakness  Psychological: Denies anxiety and depression  Endocrine: Denies heat intolerance and cold intolerance  Hematopoietic/Lymphatic: Denies bleeding problems and blood transfusions    24-hour events:      Objective   Vitals: /75   Pulse 101   Temp 99.1 °F (37.3 °C) (Oral)   Resp 24   Ht 5' (1.524 m)   Wt 233 lb 14.5 oz (106.1 kg)   SpO2 100%   BMI 45.68 kg/m²     I/O:    Intake/Output Summary (Last 24 hours) at 3/7/2020 1507  Last data filed at 3/7/2020 1242  Gross per 24 hour   Intake 860 ml   Output 100 ml   Net 760 ml       Vent Information  Skin Assessment: Clean, dry, & intact  Vt Ordered: 500 mL  FiO2 : 50 %       IPAP: 14 cmH20  CPAP/EPAP: 5 cmH2O     CURRENT MEDS :  Scheduled Meds:   enoxaparin  40 mg Subcutaneous Daily    atorvastatin  40 mg Oral Daily    gabapentin  300 mg Oral TID    sertraline  50 mg Oral Daily    triamterene-hydrochlorothiazide  1 tablet Oral Daily    sodium chloride flush  10 mL Intravenous 2 times per day    nicotine  1 patch Transdermal Daily    insulin lispro  0-12 Units Subcutaneous TID WC    insulin lispro  0-6 Units Subcutaneous Nightly    ampicillin-sulbactam  3 g Intravenous Q6H       Physical Exam:  General Appearance: appears comfortable in no acute distress. HEENT: Normocephalic atraumatic without obvious abnormality   Neck: Lips, mucosa, and tongue normal.  Supple, symmetrical, trachea midline, no adenopathy;thyroid:  no enlargement/tenderness/nodules or JVD. Lung: Breath sounds CTA. Respirations   unlabored. Symmetrical expansion. Heart: RRR, normal S1, S2. No MRG  Abdomen: Soft, NT, ND. BS present x 4 quadrants. No bruit or organomegaly. Extremities: Pedal pulses 2+ symmetric b/l. Extremities normal, no cyanosis, clubbing, or edema. Musculokeletal: No joint swelling, no muscle tenderness. ROM normal in all joints of extremities. Neurologic: Mental status: Alert and Oriented X3 . Pertinent/ New Labs and Imaging Studies     Imaging Personally Reviewed:    Chest x-ray today  ECHO        Summary   Ejection fraction is visually estimated at 55%. No regional wall motion abnormalities seen. Normal right ventricle structure and function. Physiologic and/or trace mitral regurgitation is present. Physiologic and/or trace tricuspid regurgitation. RVSP is 40 mmHg.       Signature      ----------------------------------------------------------------   Electronically signed by Elida Azar DO(Interpreting   physician) on 03/04/2020 05:10 PM   ----------------------------------------------------------------    Labs:  Lab Results   Component Value Date    WBC 11.8 03/07/2020 HGB 9.9 03/07/2020    HCT 30.7 03/07/2020    MCV 90.6 03/07/2020    MCH 29.2 03/07/2020    MCHC 32.2 03/07/2020    RDW 13.0 03/07/2020     03/07/2020    MPV 11.5 03/07/2020     Lab Results   Component Value Date     03/07/2020    K 3.4 03/07/2020    K 3.9 03/05/2020    CL 89 03/07/2020    CO2 29 03/07/2020    BUN 4 03/07/2020    CREATININE 0.4 03/07/2020    LABALBU 3.0 03/06/2020    LABALBU 3.8 03/15/2012    CALCIUM 8.6 03/07/2020    GFRAA >60 03/07/2020    LABGLOM >60 03/07/2020     Lab Results   Component Value Date    PROTIME 10.7 03/15/2012    INR 1.1 03/15/2012     No results for input(s): PROBNP in the last 72 hours. No results for input(s): PROCAL in the last 72 hours. This SmartLink has not been configured with any valid records. Micro:  No results for input(s): CULTRESP in the last 72 hours. No results for input(s): LABGRAM in the last 72 hours. No results for input(s): LEGUR in the last 72 hours. No results for input(s): STREPNEUMAGU in the last 72 hours. No results for input(s): LP1UAG in the last 72 hours. Assessment:    1. Acute respiratory failure with hypoxia  2. Non cardiogenic pulmonary edema  3. Possible aspiration pneumonia secondary to overdose, unresponsiveness-given narcan  4. Accidental drug overdose   5. Active tobacco abuse 1/2 ppd   6. Likely COPD, chronic bronchitis not in acute exacerbation   7. DM II  8. Chronic back pain  9. Obesity,  probable NELLIE  10. Hx ETOH   11. HTN         Plan:   1. Prior to for saturations above 92% explained to the patient importance of wearing noninvasive ventilator at nighttime  2. PFTs once patient is more stable  3. Needs polysomnography as an outpatient  4. Can switch Unasyn to Augmentin  5. PT/ OT  6. Smoking cessation  7. We will follow chest x-ray in a.m.       .   Electronically signed by Juanita Blanco MD on 3/7/2020 at 3:07 PM

## 2020-03-08 LAB
ANION GAP SERPL CALCULATED.3IONS-SCNC: 12 MMOL/L (ref 7–16)
BUN BLDV-MCNC: 7 MG/DL (ref 6–20)
CALCIUM SERPL-MCNC: 9.3 MG/DL (ref 8.6–10.2)
CHLORIDE BLD-SCNC: 90 MMOL/L (ref 98–107)
CO2: 34 MMOL/L (ref 22–29)
CREAT SERPL-MCNC: 0.5 MG/DL (ref 0.5–1)
GFR AFRICAN AMERICAN: >60
GFR NON-AFRICAN AMERICAN: >60 ML/MIN/1.73
GLUCOSE BLD-MCNC: 163 MG/DL (ref 74–99)
MAGNESIUM: 1.9 MG/DL (ref 1.6–2.6)
METER GLUCOSE: 205 MG/DL (ref 74–99)
METER GLUCOSE: 237 MG/DL (ref 74–99)
METER GLUCOSE: 257 MG/DL (ref 74–99)
METER GLUCOSE: 374 MG/DL (ref 74–99)
PHOSPHORUS: 3.1 MG/DL (ref 2.5–4.5)
POTASSIUM SERPL-SCNC: 3.1 MMOL/L (ref 3.5–5)
SODIUM BLD-SCNC: 136 MMOL/L (ref 132–146)

## 2020-03-08 PROCEDURE — 82962 GLUCOSE BLOOD TEST: CPT

## 2020-03-08 PROCEDURE — 6370000000 HC RX 637 (ALT 250 FOR IP): Performed by: INTERNAL MEDICINE

## 2020-03-08 PROCEDURE — 6360000002 HC RX W HCPCS: Performed by: INTERNAL MEDICINE

## 2020-03-08 PROCEDURE — 94660 CPAP INITIATION&MGMT: CPT

## 2020-03-08 PROCEDURE — 36415 COLL VENOUS BLD VENIPUNCTURE: CPT

## 2020-03-08 PROCEDURE — 83735 ASSAY OF MAGNESIUM: CPT

## 2020-03-08 PROCEDURE — 2060000000 HC ICU INTERMEDIATE R&B

## 2020-03-08 PROCEDURE — 84100 ASSAY OF PHOSPHORUS: CPT

## 2020-03-08 PROCEDURE — 2580000003 HC RX 258: Performed by: INTERNAL MEDICINE

## 2020-03-08 PROCEDURE — 99232 SBSQ HOSP IP/OBS MODERATE 35: CPT | Performed by: INTERNAL MEDICINE

## 2020-03-08 PROCEDURE — 80048 BASIC METABOLIC PNL TOTAL CA: CPT

## 2020-03-08 PROCEDURE — 2700000000 HC OXYGEN THERAPY PER DAY

## 2020-03-08 RX ORDER — POTASSIUM CHLORIDE 20 MEQ/1
40 TABLET, EXTENDED RELEASE ORAL ONCE
Status: COMPLETED | OUTPATIENT
Start: 2020-03-08 | End: 2020-03-08

## 2020-03-08 RX ADMIN — ATORVASTATIN CALCIUM 40 MG: 40 TABLET, FILM COATED ORAL at 08:57

## 2020-03-08 RX ADMIN — SODIUM CHLORIDE, PRESERVATIVE FREE 10 ML: 5 INJECTION INTRAVENOUS at 08:58

## 2020-03-08 RX ADMIN — AMPICILLIN SODIUM AND SULBACTAM SODIUM 3 G: 2; 1 INJECTION, POWDER, FOR SOLUTION INTRAMUSCULAR; INTRAVENOUS at 18:28

## 2020-03-08 RX ADMIN — ENOXAPARIN SODIUM 40 MG: 40 INJECTION SUBCUTANEOUS at 08:58

## 2020-03-08 RX ADMIN — TRAMADOL HYDROCHLORIDE 50 MG: 50 TABLET, FILM COATED ORAL at 20:01

## 2020-03-08 RX ADMIN — SERTRALINE HYDROCHLORIDE 50 MG: 50 TABLET ORAL at 08:57

## 2020-03-08 RX ADMIN — TRAMADOL HYDROCHLORIDE 50 MG: 50 TABLET, FILM COATED ORAL at 09:03

## 2020-03-08 RX ADMIN — INSULIN LISPRO 6 UNITS: 100 INJECTION, SOLUTION INTRAVENOUS; SUBCUTANEOUS at 06:22

## 2020-03-08 RX ADMIN — POTASSIUM CHLORIDE 40 MEQ: 20 TABLET, EXTENDED RELEASE ORAL at 12:34

## 2020-03-08 RX ADMIN — AMPICILLIN SODIUM AND SULBACTAM SODIUM 3 G: 2; 1 INJECTION, POWDER, FOR SOLUTION INTRAMUSCULAR; INTRAVENOUS at 12:25

## 2020-03-08 RX ADMIN — AMPICILLIN SODIUM AND SULBACTAM SODIUM 3 G: 2; 1 INJECTION, POWDER, FOR SOLUTION INTRAMUSCULAR; INTRAVENOUS at 08:55

## 2020-03-08 RX ADMIN — SODIUM CHLORIDE, PRESERVATIVE FREE 10 ML: 5 INJECTION INTRAVENOUS at 20:01

## 2020-03-08 RX ADMIN — INSULIN LISPRO 2 UNITS: 100 INJECTION, SOLUTION INTRAVENOUS; SUBCUTANEOUS at 20:02

## 2020-03-08 RX ADMIN — AMPICILLIN SODIUM AND SULBACTAM SODIUM 3 G: 2; 1 INJECTION, POWDER, FOR SOLUTION INTRAMUSCULAR; INTRAVENOUS at 03:11

## 2020-03-08 RX ADMIN — GABAPENTIN 300 MG: 300 CAPSULE ORAL at 08:57

## 2020-03-08 RX ADMIN — INSULIN LISPRO 4 UNITS: 100 INJECTION, SOLUTION INTRAVENOUS; SUBCUTANEOUS at 12:31

## 2020-03-08 RX ADMIN — INSULIN LISPRO 10 UNITS: 100 INJECTION, SOLUTION INTRAVENOUS; SUBCUTANEOUS at 17:08

## 2020-03-08 RX ADMIN — TRIAMTERENE AND HYDROCHLOROTHIAZIDE 1 TABLET: 37.5; 25 TABLET ORAL at 08:57

## 2020-03-08 ASSESSMENT — PAIN DESCRIPTION - FREQUENCY: FREQUENCY: CONTINUOUS

## 2020-03-08 ASSESSMENT — PAIN SCALES - GENERAL
PAINLEVEL_OUTOF10: 5
PAINLEVEL_OUTOF10: 0
PAINLEVEL_OUTOF10: 10
PAINLEVEL_OUTOF10: 10

## 2020-03-08 ASSESSMENT — PAIN DESCRIPTION - ORIENTATION: ORIENTATION: LOWER

## 2020-03-08 ASSESSMENT — PAIN DESCRIPTION - ONSET: ONSET: ON-GOING

## 2020-03-08 ASSESSMENT — PAIN - FUNCTIONAL ASSESSMENT: PAIN_FUNCTIONAL_ASSESSMENT: PREVENTS OR INTERFERES SOME ACTIVE ACTIVITIES AND ADLS

## 2020-03-08 ASSESSMENT — PAIN DESCRIPTION - LOCATION: LOCATION: BACK

## 2020-03-08 ASSESSMENT — PAIN DESCRIPTION - PAIN TYPE: TYPE: CHRONIC PAIN

## 2020-03-08 NOTE — PROGRESS NOTES
[facility] Quality Flow/Interdisciplinary Rounds Progress Note        Quality Flow Rounds held on March 8, 2020    Disciplines Attending:  Bedside Nurse, ,  and Nursing Unit Leadership    Daniel Hughes was admitted on 3/3/2020  9:40 PM    Anticipated Discharge Date:  Expected Discharge Date: 03/09/20    Disposition:    Terrell Score:  Terrell Scale Score: 20    Readmission Risk              Risk of Unplanned Readmission:        13           Discussed patient goal for the day, patient clinical progression, and barriers to discharge.   The following Goal(s) of the Day/Commitment(s) have been identified:  Other  Continue to monitor electrolytes      Radha Basilio  March 8, 2020

## 2020-03-08 NOTE — PROGRESS NOTES
4* 4* 7   CREATININE 0.5 0.4* 0.5   GLUCOSE 241* 229* 163*   CALCIUM 8.8 8.6 9.3       Recent Labs     03/06/20  0510 03/07/20  0449   WBC 12.1* 11.8*   RBC 3.41* 3.39*   HGB 10.2* 9.9*   HCT 31.5* 30.7*   MCV 92.4 90.6   MCH 29.9 29.2   MCHC 32.4 32.2   RDW 13.2 13.0    189   MPV 11.3 11.5       BMP:    Lab Results   Component Value Date     03/08/2020    K 3.1 03/08/2020    K 3.9 03/05/2020    CL 90 03/08/2020    CO2 34 03/08/2020    BUN 7 03/08/2020    LABALBU 3.0 03/06/2020    LABALBU 3.8 03/15/2012    CREATININE 0.5 03/08/2020    CALCIUM 9.3 03/08/2020    GFRAA >60 03/08/2020    LABGLOM >60 03/08/2020    GLUCOSE 163 03/08/2020    GLUCOSE 124 03/15/2012     Magnesium:    Lab Results   Component Value Date    MG 1.9 03/08/2020     Phosphorus:    Lab Results   Component Value Date    PHOS 3.1 03/08/2020        Radiology:   CTA PULMONARY W CONTRAST   Final Result      Bilateral patchy groundglass infiltrates in the perihilar and   perivascular distributions could reflect infection, aspiration or   volume overload. Mediastinal and bilateral hilar lymphadenopathy. No evidence for pulmonary embolism or aortic dissection. XR CHEST PORTABLE   Final Result      Diffuse infiltrates in both lungs suggesting of interstitial pulmonary   edema which appears to be unchanged from prior study. XR CHEST PORTABLE   Final Result      Ill-defined infiltrate seen in both lung which was seen before and   appears to be worsening. CT Chest WO Contrast   Final Result   1. Extensive bilateral lung infiltrates. The findings to be correlate   clinically. Pneumonia including opportunistic infection and   noncardiogenic pulmonary edema are part of the differential diagnosis.       2. Bronchocentric infiltrates to the lungs not extending into the   pleural surface can be manifestation of arteriolitis/pulmonary   hemorrhage complex, particularly considering the symmetry between   findings on the

## 2020-03-08 NOTE — PROGRESS NOTES
Date: 3/7/2020    Time: 10:25 PM    Patient Placed On BIPAP/CPAP/ Non-Invasive Ventilation? No    If no must comment. Facial area red/color change? No           If YES are Blister/Lesion present? No   If yes must notify nursing staff  BIPAP/CPAP skin barrier? Yes    Skin barrier type:duoderm       Comments: Patient refusing BIPAP.  Machine in room if needed        [employer]

## 2020-03-09 VITALS
RESPIRATION RATE: 18 BRPM | WEIGHT: 229.4 LBS | SYSTOLIC BLOOD PRESSURE: 115 MMHG | HEART RATE: 90 BPM | BODY MASS INDEX: 45.04 KG/M2 | DIASTOLIC BLOOD PRESSURE: 58 MMHG | TEMPERATURE: 97.9 F | HEIGHT: 60 IN | OXYGEN SATURATION: 100 %

## 2020-03-09 LAB
ANION GAP SERPL CALCULATED.3IONS-SCNC: 12 MMOL/L (ref 7–16)
BLOOD CULTURE, ROUTINE: NORMAL
BUN BLDV-MCNC: 7 MG/DL (ref 6–20)
CALCIUM SERPL-MCNC: 9.1 MG/DL (ref 8.6–10.2)
CHLORIDE BLD-SCNC: 92 MMOL/L (ref 98–107)
CO2: 30 MMOL/L (ref 22–29)
CREAT SERPL-MCNC: 0.6 MG/DL (ref 0.5–1)
CULTURE, BLOOD 2: NORMAL
GFR AFRICAN AMERICAN: >60
GFR NON-AFRICAN AMERICAN: >60 ML/MIN/1.73
GLUCOSE BLD-MCNC: 188 MG/DL (ref 74–99)
HCT VFR BLD CALC: 36.5 % (ref 34–48)
HEMOGLOBIN: 11.5 G/DL (ref 11.5–15.5)
MCH RBC QN AUTO: 28.3 PG (ref 26–35)
MCHC RBC AUTO-ENTMCNC: 31.5 % (ref 32–34.5)
MCV RBC AUTO: 89.9 FL (ref 80–99.9)
METER GLUCOSE: 179 MG/DL (ref 74–99)
METER GLUCOSE: 225 MG/DL (ref 74–99)
PDW BLD-RTO: 13 FL (ref 11.5–15)
PLATELET # BLD: 303 E9/L (ref 130–450)
PMV BLD AUTO: 10.4 FL (ref 7–12)
POTASSIUM SERPL-SCNC: 3.6 MMOL/L (ref 3.5–5)
RBC # BLD: 4.06 E12/L (ref 3.5–5.5)
SODIUM BLD-SCNC: 134 MMOL/L (ref 132–146)
WBC # BLD: 8.7 E9/L (ref 4.5–11.5)

## 2020-03-09 PROCEDURE — 2580000003 HC RX 258: Performed by: INTERNAL MEDICINE

## 2020-03-09 PROCEDURE — 6360000002 HC RX W HCPCS: Performed by: INTERNAL MEDICINE

## 2020-03-09 PROCEDURE — 36415 COLL VENOUS BLD VENIPUNCTURE: CPT

## 2020-03-09 PROCEDURE — 6370000000 HC RX 637 (ALT 250 FOR IP): Performed by: INTERNAL MEDICINE

## 2020-03-09 PROCEDURE — 82962 GLUCOSE BLOOD TEST: CPT

## 2020-03-09 PROCEDURE — 99239 HOSP IP/OBS DSCHRG MGMT >30: CPT | Performed by: INTERNAL MEDICINE

## 2020-03-09 PROCEDURE — 85027 COMPLETE CBC AUTOMATED: CPT

## 2020-03-09 PROCEDURE — 80048 BASIC METABOLIC PNL TOTAL CA: CPT

## 2020-03-09 PROCEDURE — 94660 CPAP INITIATION&MGMT: CPT

## 2020-03-09 RX ORDER — NICOTINE 21 MG/24HR
1 PATCH, TRANSDERMAL 24 HOURS TRANSDERMAL DAILY
Qty: 30 PATCH | Refills: 0 | Status: SHIPPED | OUTPATIENT
Start: 2020-03-10 | End: 2020-06-29

## 2020-03-09 RX ORDER — ALBUTEROL SULFATE 90 UG/1
2 AEROSOL, METERED RESPIRATORY (INHALATION) EVERY 6 HOURS PRN
Qty: 1 INHALER | Refills: 3 | Status: SHIPPED | OUTPATIENT
Start: 2020-03-09 | End: 2020-07-23 | Stop reason: SDUPTHER

## 2020-03-09 RX ORDER — AMOXICILLIN AND CLAVULANATE POTASSIUM 875; 125 MG/1; MG/1
1 TABLET, FILM COATED ORAL 2 TIMES DAILY
Qty: 8 TABLET | Refills: 0 | Status: SHIPPED | OUTPATIENT
Start: 2020-03-10 | End: 2020-03-14

## 2020-03-09 RX ADMIN — ENOXAPARIN SODIUM 40 MG: 40 INJECTION SUBCUTANEOUS at 09:16

## 2020-03-09 RX ADMIN — SERTRALINE HYDROCHLORIDE 50 MG: 50 TABLET ORAL at 09:16

## 2020-03-09 RX ADMIN — GABAPENTIN 300 MG: 300 CAPSULE ORAL at 09:15

## 2020-03-09 RX ADMIN — AMPICILLIN SODIUM AND SULBACTAM SODIUM 3 G: 2; 1 INJECTION, POWDER, FOR SOLUTION INTRAMUSCULAR; INTRAVENOUS at 06:41

## 2020-03-09 RX ADMIN — ATORVASTATIN CALCIUM 40 MG: 40 TABLET, FILM COATED ORAL at 09:16

## 2020-03-09 RX ADMIN — AMPICILLIN SODIUM AND SULBACTAM SODIUM 3 G: 2; 1 INJECTION, POWDER, FOR SOLUTION INTRAMUSCULAR; INTRAVENOUS at 00:30

## 2020-03-09 RX ADMIN — AMPICILLIN SODIUM AND SULBACTAM SODIUM 3 G: 2; 1 INJECTION, POWDER, FOR SOLUTION INTRAMUSCULAR; INTRAVENOUS at 12:14

## 2020-03-09 RX ADMIN — SODIUM CHLORIDE, PRESERVATIVE FREE 10 ML: 5 INJECTION INTRAVENOUS at 09:16

## 2020-03-09 RX ADMIN — INSULIN LISPRO 4 UNITS: 100 INJECTION, SOLUTION INTRAVENOUS; SUBCUTANEOUS at 11:35

## 2020-03-09 RX ADMIN — TRAMADOL HYDROCHLORIDE 50 MG: 50 TABLET, FILM COATED ORAL at 09:16

## 2020-03-09 RX ADMIN — TRIAMTERENE AND HYDROCHLOROTHIAZIDE 1 TABLET: 37.5; 25 TABLET ORAL at 09:15

## 2020-03-09 RX ADMIN — INSULIN LISPRO 2 UNITS: 100 INJECTION, SOLUTION INTRAVENOUS; SUBCUTANEOUS at 06:41

## 2020-03-09 ASSESSMENT — PAIN DESCRIPTION - ORIENTATION: ORIENTATION: LOWER

## 2020-03-09 ASSESSMENT — PAIN DESCRIPTION - DESCRIPTORS: DESCRIPTORS: ACHING;DISCOMFORT;PRESSURE

## 2020-03-09 ASSESSMENT — PAIN - FUNCTIONAL ASSESSMENT: PAIN_FUNCTIONAL_ASSESSMENT: PREVENTS OR INTERFERES SOME ACTIVE ACTIVITIES AND ADLS

## 2020-03-09 ASSESSMENT — PAIN DESCRIPTION - ONSET: ONSET: ON-GOING

## 2020-03-09 ASSESSMENT — PAIN SCALES - GENERAL: PAINLEVEL_OUTOF10: 8

## 2020-03-09 ASSESSMENT — PAIN DESCRIPTION - LOCATION: LOCATION: BACK

## 2020-03-09 ASSESSMENT — PAIN DESCRIPTION - FREQUENCY: FREQUENCY: CONTINUOUS

## 2020-03-09 ASSESSMENT — PAIN DESCRIPTION - PROGRESSION: CLINICAL_PROGRESSION: GRADUALLY WORSENING

## 2020-03-09 ASSESSMENT — PAIN DESCRIPTION - PAIN TYPE: TYPE: CHRONIC PAIN

## 2020-03-09 NOTE — PLAN OF CARE
Problem: Falls - Risk of:  Goal: Will remain free from falls  Description: Will remain free from falls  3/9/2020 0944 by Nate Arriaga RN  Outcome: Met This Shift  3/9/2020 0059 by Emil Pettit RN  Outcome: Met This Shift  Goal: Absence of physical injury  Description: Absence of physical injury  3/9/2020 0944 by Nate Arriaga RN  Outcome: Met This Shift  3/9/2020 0059 by Emil Pettit RN  Outcome: Met This Shift

## 2020-03-09 NOTE — PROGRESS NOTES
Ana Perry M.D.,[facility]  Roseanne Doherty D.O., F.A.C.O.I., Yamila Otoole M.D. Michael Lora M.D., Alissa Brown M.D. uGillermina Galeano D.O. Daily Pulmonary Progress Note    Patient:  Lauren Rodriguez 47 y.o. female MRN: [id number]     Date of Service: 3/9/2020      Synopsis     We are following patient for acute hypoxic and hypercapnic respiratory failure    \"CC\" ; with mental status    Code status: Full code      Subjective      Patient was seen and examined. Laying in bed in no distress. Saturating 100% off oxygen. Refused her noninvasive ventilator last night. Review of Systems:  Constitutional: Denies fever, weight loss, night sweats, and fatigue  Skin: Denies pigmentation, dark lesions, and rashes   HEENT: Denies hearing loss, tinnitus, ear drainage, epistaxis, sore throat, and hoarseness. Cardiovascular: Denies palpitations, chest pain, and chest pressure. Respiratory: Denies cough, dyspnea at rest, hemoptysis, apnea, and choking.   Gastrointestinal: Denies nausea, vomiting, poor appetite, diarrhea, heartburn or reflux  Genitourinary: Denies dysuria, frequency, urgency or hematuria  Musculoskeletal: Denies myalgias, muscle weakness, and bone pain  Neurological: Denies dizziness, vertigo, headache, and focal weakness  Psychological: Denies anxiety and depression  Endocrine: Denies heat intolerance and cold intolerance  Hematopoietic/Lymphatic: Denies bleeding problems and blood transfusions    24-hour events:  none    Objective   Vitals: BP (!) 115/58   Pulse 90   Temp 97.9 °F (36.6 °C) (Oral)   Resp 18   Ht 5' (1.524 m)   Wt 229 lb 6.4 oz (104.1 kg)   SpO2 100%   BMI 44.80 kg/m²     I/O:    Intake/Output Summary (Last 24 hours) at 3/9/2020 0948  Last data filed at 3/9/2020 0906  Gross per 24 hour   Intake 420 ml   Output --   Net 420 ml       Vent Information  Skin Assessment: Clean, dry, & intact  Vt Ordered: 500 mL  FiO2 : 50 %       IPAP: 14

## 2020-03-09 NOTE — DISCHARGE SUMMARY
[facility] [place] [place] Physician Discharge Summary       [address], DO  [address] [facility] [place] [address]  [phone]    Call in 3 days  As needed, If symptoms worsen    Jan Pires MD  [id number] [facility] [phone]    Call in 8 weeks        Activity level: As tolerated    Diet: DIET CARB CONTROL; Carb Control: 4 carb choices (60 gms)/meal    Dispo: Home with self-care    Condition on discharge stable    Patient ID:  Bertrand Yao  [id number]  47 y.o.  1970    Admit date: 3/3/2020    Discharge date and time:  3/9/2020  12:36 PM    Admission Diagnoses: Principal Problem:    Noncardiogenic pulmonary edema  Active Problems:    HTN (hypertension)    Type 2 diabetes mellitus ([address]    Bronchitis    Pneumonia    Tobacco abuse    History of alcohol use    Drug overdose    Acute respiratory failure with hypoxia and hypercapnia (HCC)    Acute pulmonary edema (HCC)  Resolved Problems:    * No resolved hospital problems. *      Discharge Diagnoses: Principal Problem:    Noncardiogenic pulmonary edema  Active Problems:    HTN (hypertension)    Type 2 diabetes mellitus (HCC)    Bronchitis    Pneumonia    Tobacco abuse    History of alcohol use    Drug overdose    Acute respiratory failure with hypoxia and hypercapnia (HCC)    Acute pulmonary edema (HCC)  Resolved Problems:    * No resolved hospital problems. *      Consults:  IP CONSULT TO PULMONOLOGY  IP CONSULT TO SOCIAL WORK  IP CONSULT TO IV TEAM      Hospital Course:   She is a 14-year-old female with past medical history of depression hypertension, type 2 diabetes, mild stroke came to ER with accidental drug overdose. As per available information she takes Percocet 10 for lower back, right leg and neck pain-she ran out of her meds and she asked a friend for pain pill and was given fentanyl pill.   Following this she passed out and next thing she knows that she was brought in 03/09/20 0030 03/09/20 0745 03/09/20 0800   BP:  117/61 (!) 115/58    Pulse:  90 92 90   Resp:  16 18    Temp:  97.9 °F (36.6 °C) 97.9 °F (36.6 °C)    TempSrc:  Oral Oral    SpO2:  95% 100%    Weight: 229 lb 6.4 oz (104.1 kg)      Height:           General Appearance: alert and oriented to person, place and time, obesity, in no acute distress  Skin: warm and dry, no rash or erythema  Head: normocephalic and atraumatic  Eyes: pupils equal, round, and reactive to light, extraocular eye movements intact, conjunctivae normal  Neck: supple and non-tender without mass  Pulmonary/Chest: Decreased breath sounds overall but otherwise clear  Cardiovascular: normal rate, regular rhythm, normal S1 and S2  Abdomen: soft, non-tender, non-distended, normal bowel sounds, no masses or organomegaly  Extremities: no cyanosis, clubbing   Musculoskeletal: normal range of motion  Neurologic: no cranial nerve deficit,  speech normal        LABS:  Recent Labs     03/07/20  0449 03/08/20  0815 03/09/20  0930   * 136 134   K 3.4* 3.1* 3.6   CL 89* 90* 92*   CO2 29 34* 30*   BUN 4* 7 7   CREATININE 0.4* 0.5 0.6   GLUCOSE 229* 163* 188*   CALCIUM 8.6 9.3 9.1       Recent Labs     03/07/20  0449 03/09/20  0930   WBC 11.8* 8.7   RBC 3.39* 4.06   HGB 9.9* 11.5   HCT 30.7* 36.5   MCV 90.6 89.9   MCH 29.2 28.3   MCHC 32.2 31.5*   RDW 13.0 13.0    303   MPV 11.5 10.4       No results for input(s): POCGLU in the last 72 hours. Imaging:   CTA PULMONARY W CONTRAST   Final Result      Bilateral patchy groundglass infiltrates in the perihilar and   perivascular distributions could reflect infection, aspiration or   volume overload. Mediastinal and bilateral hilar lymphadenopathy. No evidence for pulmonary embolism or aortic dissection. XR CHEST PORTABLE   Final Result      Diffuse infiltrates in both lungs suggesting of interstitial pulmonary   edema which appears to be unchanged from prior study.          XR CHEST

## 2020-03-12 ENCOUNTER — TELEPHONE (OUTPATIENT)
Dept: FAMILY MEDICINE CLINIC | Age: 50
End: 2020-03-12

## 2020-03-12 NOTE — TELEPHONE ENCOUNTER
[facility] Initial Follow Up Call    Outreach made within 2 business days of discharge: Yes    Patient called to follow up after hospital discharge, and to see if she needed anything, and how she was feeling, pt already has a TCM visit scheduled for 3/17    Scheduled appointment with PCP within 7-14 days    Follow Up  Future Appointments   Date Time Provider [place]   3/17/2020 12:30 PM [address], DO Jorge Luis TODD [facility]   5/26/2020 10:00 PM [facility] BEDROOM 2 [facility]   8/6/2020  2:30 PM [address], DO Kang [facility]       oRsina Esteban

## 2020-03-16 ENCOUNTER — TELEPHONE (OUTPATIENT)
Dept: FAMILY MEDICINE CLINIC | Age: 50
End: 2020-03-16

## 2020-03-16 NOTE — TELEPHONE ENCOUNTER
Patient called and notified per PCP last note that the cough medication could not be prescribed again, she can try mucinex OTC or Claritin OTC to see if that helps, pt voiced understanding KOFI

## 2020-03-19 RX ORDER — GUAIFENESIN AND CODEINE PHOSPHATE 100; 10 MG/5ML; MG/5ML
5 SOLUTION ORAL EVERY 4 HOURS PRN
Qty: 50 ML | Refills: 0 | OUTPATIENT
Start: 2020-03-19 | End: 2020-03-22

## 2020-04-17 RX ORDER — TRIAMTERENE AND HYDROCHLOROTHIAZIDE 37.5; 25 MG/1; MG/1
1 TABLET ORAL DAILY
Qty: 30 TABLET | Refills: 2 | Status: SHIPPED
Start: 2020-04-17 | End: 2020-04-27 | Stop reason: SDUPTHER

## 2020-04-24 ENCOUNTER — TELEPHONE (OUTPATIENT)
Dept: FAMILY MEDICINE CLINIC | Age: 50
End: 2020-04-24

## 2020-04-27 RX ORDER — TRIAMTERENE AND HYDROCHLOROTHIAZIDE 37.5; 25 MG/1; MG/1
1 TABLET ORAL DAILY
Qty: 30 TABLET | Refills: 2 | Status: SHIPPED
Start: 2020-04-27 | End: 2020-10-16 | Stop reason: SDUPTHER

## 2020-05-13 RX ORDER — KETOCONAZOLE 20 MG/ML
SHAMPOO TOPICAL
Qty: 1 BOTTLE | Refills: 3 | Status: SHIPPED
Start: 2020-05-13 | End: 2020-06-29

## 2020-05-28 RX ORDER — LANCETS
EACH MISCELLANEOUS
Qty: 100 EACH | Refills: 3 | Status: SHIPPED
Start: 2020-05-28 | End: 2020-06-29

## 2020-06-24 DIAGNOSIS — G47.33 OBSTRUCTIVE SLEEP APNEA (ADULT) (PEDIATRIC): Primary | ICD-10-CM

## 2020-06-29 ENCOUNTER — TELEPHONE (OUTPATIENT)
Dept: FAMILY MEDICINE CLINIC | Age: 50
End: 2020-06-29

## 2020-06-29 ENCOUNTER — HOSPITAL ENCOUNTER (EMERGENCY)
Age: 50
Discharge: LEFT BEFORE DISCHARGE | End: 2020-06-29
Attending: EMERGENCY MEDICINE
Payer: MEDICAID

## 2020-06-29 ENCOUNTER — APPOINTMENT (OUTPATIENT)
Dept: GENERAL RADIOLOGY | Age: 50
End: 2020-06-29
Payer: MEDICAID

## 2020-06-29 VITALS
TEMPERATURE: 98.1 F | WEIGHT: 223 LBS | HEIGHT: 60 IN | DIASTOLIC BLOOD PRESSURE: 90 MMHG | BODY MASS INDEX: 43.78 KG/M2 | RESPIRATION RATE: 19 BRPM | HEART RATE: 111 BPM | SYSTOLIC BLOOD PRESSURE: 149 MMHG | OXYGEN SATURATION: 95 %

## 2020-06-29 LAB
ACETAMINOPHEN LEVEL: <5 MCG/ML (ref 10–30)
ALBUMIN SERPL-MCNC: 3.5 G/DL (ref 3.5–5.2)
ALP BLD-CCNC: 78 U/L (ref 35–104)
ALT SERPL-CCNC: 18 U/L (ref 0–32)
AMPHETAMINE SCREEN, URINE: NOT DETECTED
ANION GAP SERPL CALCULATED.3IONS-SCNC: 18 MMOL/L (ref 7–16)
AST SERPL-CCNC: 24 U/L (ref 0–31)
B.E.: -4.8 MMOL/L (ref -3–3)
BACTERIA: ABNORMAL /HPF
BARBITURATE SCREEN URINE: NOT DETECTED
BASOPHILS ABSOLUTE: 0.04 E9/L (ref 0–0.2)
BASOPHILS RELATIVE PERCENT: 0.2 % (ref 0–2)
BENZODIAZEPINE SCREEN, URINE: NOT DETECTED
BILIRUB SERPL-MCNC: <0.2 MG/DL (ref 0–1.2)
BILIRUBIN URINE: NEGATIVE
BLOOD, URINE: ABNORMAL
BUN BLDV-MCNC: 12 MG/DL (ref 6–20)
CALCIUM SERPL-MCNC: 8.6 MG/DL (ref 8.6–10.2)
CANNABINOID SCREEN URINE: NOT DETECTED
CHLORIDE BLD-SCNC: 97 MMOL/L (ref 98–107)
CLARITY: CLEAR
CO2: 19 MMOL/L (ref 22–29)
COCAINE METABOLITE SCREEN URINE: POSITIVE
COHB: 4.8 % (ref 0–1.5)
COLOR: YELLOW
CREAT SERPL-MCNC: 0.7 MG/DL (ref 0.5–1)
CRITICAL: ABNORMAL
DATE ANALYZED: ABNORMAL
DATE OF COLLECTION: ABNORMAL
EKG ATRIAL RATE: 103 BPM
EKG P AXIS: 41 DEGREES
EKG P-R INTERVAL: 130 MS
EKG Q-T INTERVAL: 304 MS
EKG QRS DURATION: 72 MS
EKG QTC CALCULATION (BAZETT): 398 MS
EKG R AXIS: 26 DEGREES
EKG T AXIS: -10 DEGREES
EKG VENTRICULAR RATE: 103 BPM
EOSINOPHILS ABSOLUTE: 0.07 E9/L (ref 0.05–0.5)
EOSINOPHILS RELATIVE PERCENT: 0.4 % (ref 0–6)
EPITHELIAL CELLS, UA: ABNORMAL /HPF
ETHANOL: <10 MG/DL (ref 0–0.08)
FENTANYL SCREEN, URINE: POSITIVE
GFR AFRICAN AMERICAN: >60
GFR NON-AFRICAN AMERICAN: >60 ML/MIN/1.73
GLUCOSE BLD-MCNC: 406 MG/DL (ref 74–99)
GLUCOSE URINE: >=1000 MG/DL
HCG, URINE, POC: NEGATIVE
HCO3: 21.1 MMOL/L (ref 22–26)
HCT VFR BLD CALC: 38.9 % (ref 34–48)
HEMOGLOBIN: 12.4 G/DL (ref 11.5–15.5)
HHB: 7.4 % (ref 0–5)
HYALINE CASTS: ABNORMAL /LPF (ref 0–2)
IMMATURE GRANULOCYTES #: 0.2 E9/L
IMMATURE GRANULOCYTES %: 1 % (ref 0–5)
KETONES, URINE: ABNORMAL MG/DL
LAB: ABNORMAL
LEUKOCYTE ESTERASE, URINE: NEGATIVE
LYMPHOCYTES ABSOLUTE: 0.91 E9/L (ref 1.5–4)
LYMPHOCYTES RELATIVE PERCENT: 4.6 % (ref 20–42)
Lab: ABNORMAL
Lab: ABNORMAL
Lab: NORMAL
MCH RBC QN AUTO: 29.5 PG (ref 26–35)
MCHC RBC AUTO-ENTMCNC: 31.9 % (ref 32–34.5)
MCV RBC AUTO: 92.6 FL (ref 80–99.9)
METER GLUCOSE: 489 MG/DL (ref 74–99)
METHADONE SCREEN, URINE: NOT DETECTED
METHB: 0.2 % (ref 0–1.5)
MODE: ABNORMAL
MONOCYTES ABSOLUTE: 0.43 E9/L (ref 0.1–0.95)
MONOCYTES RELATIVE PERCENT: 2.2 % (ref 2–12)
NEGATIVE QC PASS/FAIL: NORMAL
NEUTROPHILS ABSOLUTE: 18.2 E9/L (ref 1.8–7.3)
NEUTROPHILS RELATIVE PERCENT: 91.6 % (ref 43–80)
NITRITE, URINE: NEGATIVE
O2 CONTENT: 16.8 ML/DL
O2 SATURATION: 92.2 % (ref 92–98.5)
O2HB: 87.6 % (ref 94–97)
OPERATOR ID: 2067
OPIATE SCREEN URINE: NOT DETECTED
OXYCODONE URINE: NOT DETECTED
PATIENT TEMP: 37 C
PCO2: 42.1 MMHG (ref 35–45)
PDW BLD-RTO: 13.2 FL (ref 11.5–15)
PH BLOOD GAS: 7.32 (ref 7.35–7.45)
PH UA: 6 (ref 5–9)
PHENCYCLIDINE SCREEN URINE: NOT DETECTED
PLATELET # BLD: 231 E9/L (ref 130–450)
PMV BLD AUTO: 11.3 FL (ref 7–12)
PO2: 68.8 MMHG (ref 75–100)
POSITIVE QC PASS/FAIL: NORMAL
POTASSIUM REFLEX MAGNESIUM: 3.7 MMOL/L (ref 3.5–5)
PROTEIN UA: 30 MG/DL
RBC # BLD: 4.2 E12/L (ref 3.5–5.5)
RBC UA: ABNORMAL /HPF (ref 0–2)
SALICYLATE, SERUM: <0.3 MG/DL (ref 0–30)
SARS-COV-2, NAAT: NOT DETECTED
SODIUM BLD-SCNC: 134 MMOL/L (ref 132–146)
SOURCE, BLOOD GAS: ABNORMAL
SPECIFIC GRAVITY UA: 1.01 (ref 1–1.03)
THB: 13.6 G/DL (ref 11.5–16.5)
TIME ANALYZED: 1418
TOTAL PROTEIN: 7.5 G/DL (ref 6.4–8.3)
TRICYCLIC ANTIDEPRESSANTS SCREEN SERUM: NEGATIVE NG/ML
TROPONIN: <0.01 NG/ML (ref 0–0.03)
UROBILINOGEN, URINE: 0.2 E.U./DL
WBC # BLD: 19.9 E9/L (ref 4.5–11.5)
WBC UA: ABNORMAL /HPF (ref 0–5)

## 2020-06-29 PROCEDURE — 82805 BLOOD GASES W/O2 SATURATION: CPT

## 2020-06-29 PROCEDURE — 99285 EMERGENCY DEPT VISIT HI MDM: CPT

## 2020-06-29 PROCEDURE — 80307 DRUG TEST PRSMV CHEM ANLYZR: CPT

## 2020-06-29 PROCEDURE — 93010 ELECTROCARDIOGRAM REPORT: CPT | Performed by: INTERNAL MEDICINE

## 2020-06-29 PROCEDURE — 84484 ASSAY OF TROPONIN QUANT: CPT

## 2020-06-29 PROCEDURE — 2580000003 HC RX 258: Performed by: PHYSICIAN ASSISTANT

## 2020-06-29 PROCEDURE — G0480 DRUG TEST DEF 1-7 CLASSES: HCPCS

## 2020-06-29 PROCEDURE — 71045 X-RAY EXAM CHEST 1 VIEW: CPT

## 2020-06-29 PROCEDURE — 80053 COMPREHEN METABOLIC PANEL: CPT

## 2020-06-29 PROCEDURE — 93005 ELECTROCARDIOGRAM TRACING: CPT | Performed by: PHYSICIAN ASSISTANT

## 2020-06-29 PROCEDURE — U0002 COVID-19 LAB TEST NON-CDC: HCPCS

## 2020-06-29 PROCEDURE — 82962 GLUCOSE BLOOD TEST: CPT

## 2020-06-29 PROCEDURE — 85025 COMPLETE CBC W/AUTO DIFF WBC: CPT

## 2020-06-29 PROCEDURE — 81001 URINALYSIS AUTO W/SCOPE: CPT

## 2020-06-29 RX ORDER — 0.9 % SODIUM CHLORIDE 0.9 %
1000 INTRAVENOUS SOLUTION INTRAVENOUS ONCE
Status: COMPLETED | OUTPATIENT
Start: 2020-06-29 | End: 2020-06-29

## 2020-06-29 RX ORDER — CEFDINIR 300 MG/1
300 CAPSULE ORAL 2 TIMES DAILY
Qty: 14 CAPSULE | Refills: 0 | Status: SHIPPED | OUTPATIENT
Start: 2020-06-29 | End: 2020-07-06

## 2020-06-29 RX ORDER — DOXYCYCLINE HYCLATE 100 MG
100 TABLET ORAL 2 TIMES DAILY
Qty: 20 TABLET | Refills: 0 | Status: SHIPPED | OUTPATIENT
Start: 2020-06-29 | End: 2020-07-09

## 2020-06-29 RX ORDER — NALOXONE HYDROCHLORIDE 0.4 MG/ML
0.4 INJECTION, SOLUTION INTRAMUSCULAR; INTRAVENOUS; SUBCUTANEOUS ONCE
Status: DISCONTINUED | OUTPATIENT
Start: 2020-06-29 | End: 2020-06-29

## 2020-06-29 RX ORDER — NYSTATIN 100000 [USP'U]/G
1 POWDER TOPICAL 3 TIMES DAILY PRN
COMMUNITY
End: 2021-02-26

## 2020-06-29 RX ORDER — KETOCONAZOLE 20 MG/ML
5 SHAMPOO TOPICAL
COMMUNITY
End: 2020-09-09 | Stop reason: SDUPTHER

## 2020-06-29 RX ADMIN — SODIUM CHLORIDE 1000 ML: 9 INJECTION, SOLUTION INTRAVENOUS at 14:59

## 2020-06-29 ASSESSMENT — PAIN DESCRIPTION - PAIN TYPE: TYPE: CHRONIC PAIN

## 2020-06-29 ASSESSMENT — PAIN DESCRIPTION - ORIENTATION: ORIENTATION: LOWER

## 2020-06-29 ASSESSMENT — PAIN DESCRIPTION - LOCATION: LOCATION: BACK

## 2020-06-29 NOTE — ED PROVIDER NOTES
ED Attending  CC: No    HPI:  6/29/20,   Time: 1:47 PM EDT       Wong Gloria is a 47 y.o. female presenting to the ED for unresponsiveness, beginning just prior to arrival.  The complaint has been intermittent, moderate in severity, and worsened by nothing. Patient reports that she is unaware of what happened earlier today but does report that EMS brought her into the hospital.  Upon chart review patient was here in March 2020 due to an overdose. Reports that she takes Percocet normally for her back pain but asked her friend for a pain pill and was given fentanyl. Patient is unsure of any change of her medications today. Patient has no complaints in the emergency department today. She was found to be slightly hypoxic upon arrival.  Patient reports that she has a history of asthma and has been compliant with her medications with this. Patient also has a history of obstructive sleep apnea. Afebrile at home without recent travel or sick contacts. Patient reports that she is been eating and drinking normally. States that her last several blood sugar checks have been in the 170s. Denies all other symptoms at this time. Review of Systems:   Pertinent positives and negatives are stated within HPI, all other systems reviewed and are negative.          --------------------------------------------- PAST HISTORY ---------------------------------------------  Past Medical History:  has a past medical history of Arthritis, Blood transfusion, Bronchitis, Depression, Hypertension, Pneumonia, and [facility] Past Surgical History:  has a past surgical history that includes Appendectomy; Cholecystectomy; Foot surgery (2/20/12); and Tubal ligation (1991). Social History:  reports that she has been smoking. She started smoking about 25 years ago. She has a 7.50 pack-year smoking history. She has never used smokeless tobacco. She reports current alcohol use.  She reports that she does not use drugs. Family History: family history includes Cancer in her mother; Hypertension in her mother; No Known Problems in her father. The patients home medications have been reviewed. Allergies: Latex; Motrin [ibuprofen micronized]; and Advil [ibuprofen]        ---------------------------------------------------PHYSICAL EXAM--------------------------------------    Constitutional/General: Alert and oriented x3, well appearing, non toxic in NAD  Head: Normocephalic and atraumatic  Eyes: PERRL, EOMI, conjunctive normal, sclera non icteric  Mouth: Oropharynx clear, handling secretions, no trismus, no asymmetry of the posterior oropharynx or uvular edema  Neck: Supple, full ROM, non tender to palpation in the midline, no stridor, no crepitus, no meningeal signs  Respiratory: Lungs clear to auscultation bilaterally, no wheezes, rales, or rhonchi. Not in respiratory distress  Cardiovascular:  Regular rate. Regular rhythm. No murmurs, gallops, or rubs. 2+ distal pulses  Chest: No chest wall tenderness  GI:  Abdomen Soft, Non tender, Non distended. +BS. No organomegaly, no palpable masses,  No rebound, guarding, or rigidity. Musculoskeletal: Moves all extremities x 4. Warm and well perfused, no clubbing, cyanosis, or edema. Capillary refill <3 seconds  Integument: skin warm and dry. No rashes. Lymphatic: no lymphadenopathy noted  Neurologic: GCS 15, no focal deficits, symmetric strength 5/5 in the upper and lower extremities bilaterally  Psychiatric: Normal Affect    -------------------------------------------------- RESULTS -------------------------------------------------  I have personally reviewed all laboratory and imaging results for this patient. Results are listed below.      LABS:  Results for orders placed or performed during the hospital encounter of 06/29/20   CBC Auto Differential   Result Value Ref Range    WBC 19.9 (H) 4.5 - 11.5 E9/L    RBC 4.20 3.50 - 5.50 E12/L    Hemoglobin 12.4 11.5 - 15.5 g/dL    Hematocrit 38.9 34.0 - 48.0 %    MCV 92.6 80.0 - 99.9 fL    MCH 29.5 26.0 - 35.0 pg    MCHC 31.9 (L) 32.0 - 34.5 %    RDW 13.2 11.5 - 15.0 fL    Platelets 738 398 - 595 E9/L    MPV 11.3 7.0 - 12.0 fL    Neutrophils % 91.6 (H) 43.0 - 80.0 %    Immature Granulocytes % 1.0 0.0 - 5.0 %    Lymphocytes % 4.6 (L) 20.0 - 42.0 %    Monocytes % 2.2 2.0 - 12.0 %    Eosinophils % 0.4 0.0 - 6.0 %    Basophils % 0.2 0.0 - 2.0 %    Neutrophils Absolute 18.20 (H) 1.80 - 7.30 E9/L    Immature Granulocytes # 0.20 E9/L    Lymphocytes Absolute 0.91 (L) 1.50 - 4.00 E9/L    Monocytes Absolute 0.43 0.10 - 0.95 E9/L    Eosinophils Absolute 0.07 0.05 - 0.50 E9/L    Basophils Absolute 0.04 0.00 - 0.20 E9/L   Comprehensive Metabolic Panel w/ Reflex to MG   Result Value Ref Range    Sodium 134 132 - 146 mmol/L    Potassium reflex Magnesium 3.7 3.5 - 5.0 mmol/L    Chloride 97 (L) 98 - 107 mmol/L    CO2 19 (L) 22 - 29 mmol/L    Anion Gap 18 (H) 7 - 16 mmol/L    Glucose 406 (H) 74 - 99 mg/dL    BUN 12 6 - 20 mg/dL    CREATININE 0.7 0.5 - 1.0 mg/dL    GFR Non-African American >60 >=60 mL/min/1.73    GFR African American >60     Calcium 8.6 8.6 - 10.2 mg/dL    Total Protein 7.5 6.4 - 8.3 g/dL    Alb 3.5 3.5 - 5.2 g/dL    Total Bilirubin <0.2 0.0 - 1.2 mg/dL    Alkaline Phosphatase 78 35 - 104 U/L    ALT 18 0 - 32 U/L    AST 24 0 - 31 U/L   Troponin   Result Value Ref Range    Troponin <0.01 0.00 - 0.03 ng/mL   Urinalysis, reflex to microscopic   Result Value Ref Range    Color, UA Yellow Straw/Yellow    Clarity, UA Clear Clear    Glucose, Ur >=1000 (A) Negative mg/dL    Bilirubin Urine Negative Negative    Ketones, Urine TRACE (A) Negative mg/dL    Specific Gravity, UA 1.015 1.005 - 1.030    Blood, Urine TRACE-INTACT Negative    pH, UA 6.0 5.0 - 9.0    Protein, UA 30 (A) Negative mg/dL    Urobilinogen, Urine 0.2 <2.0 E.U./dL    Nitrite, Urine Negative Negative    Leukocyte Esterase, Urine Negative Negative   Serum Drug Screen   Result Value Ref Range    Ethanol Lvl <10 mg/dL    Acetaminophen Level <5.0 (L) 10.0 - 12.3 mcg/mL    Salicylate, Serum <6.5 0.0 - 30.0 mg/dL    TCA Scrn NEGATIVE Cutoff:300 ng/mL   Urine Drug Screen   Result Value Ref Range    Amphetamine Screen, Urine NOT DETECTED Negative <1000 ng/mL    Barbiturate Screen, Ur NOT DETECTED Negative < 200 ng/mL    Benzodiazepine Screen, Urine NOT DETECTED Negative < 200 ng/mL    Cannabinoid Scrn, Ur NOT DETECTED Negative < 50ng/mL    Cocaine Metabolite Screen, Urine POSITIVE (A) Negative < 300 ng/mL    Opiate Scrn, Ur NOT DETECTED Negative < 300ng/mL    PCP Screen, Urine NOT DETECTED Negative < 25 ng/mL    Methadone Screen, Urine NOT DETECTED Negative <300 ng/mL    Oxycodone Urine NOT DETECTED Negative <100 ng/mL    FENTANYL SCREEN, URINE POSITIVE (A) Negative <1 ng/mL    Drug Screen Comment: see below    Blood Gas, Arterial   Result Value Ref Range    Date Analyzed [id number]     Time Analyzed 1418     Source: Blood Arterial     pH, Blood Gas 7.318 (L) 7.350 - 7.450    PCO2 42.1 35.0 - 45.0 mmHg    PO2 68.8 (L) 75.0 - 100.0 mmHg    HCO3 21.1 (L) 22.0 - 26.0 mmol/L    B.E. -4.8 (L) -3.0 - 3.0 mmol/L    O2 Sat 92.2 92.0 - 98.5 %    O2Hb 87.6 (L) 94.0 - 97.0 %    COHb 4.8 (H) 0.0 - 1.5 %    MetHb 0.2 0.0 - 1.5 %    O2 Content 16.8 mL/dL    HHb 7.4 (H) 0.0 - 5.0 %    tHb (est) 13.6 11.5 - 16.5 g/dL    Mode NC- 4 L     Date Of Collection      Time Collected      Pt Temp 37.0 C     ID [id number]     Lab [id number]     Critical(s) Notified .  No Critical Values    Microscopic Urinalysis   Result Value Ref Range    Hyaline Casts, UA 0-2 0 - 2 /LPF    WBC, UA 1-3 0 - 5 /HPF    RBC, UA 1-3 0 - 2 /HPF    Epithelial Cells, UA MODERATE /HPF    Bacteria, UA RARE (A) None Seen /HPF   POCT Glucose   Result Value Ref Range    Meter Glucose 489 (H) 74 - 99 mg/dL   POC Pregnancy Urine   Result Value Ref Range    HCG, Urine, POC Negative Negative    Lot Number [id number]     Positive QC Pass/Fail Pass     Negative QC AGAINST MEDICAL ADVICE.    [MS]   0817 Reassessed patient. At this time she is requesting to leave [address] Again discussed the risks of leaving [address] including but not limited to worsening of condition, loss of current lifestyle, permanent disability and death. Patient voiced understanding and would still like to leave. Will provide prescriptions for antibiotics and Narcan for patient since she is leaving the emergency department. She states that she is planning to return tomorrow for hospital admission. [MS]      ED Course User Index  [MS] Selena Martinez [place]         This patient's ED course included: a personal history and physicial examination, multiple bedside re-evaluations, IV medications and cardiac monitoring    This patient has improved during their ED course. Re-Evaluations:             Re-evaluation. Patients symptoms are improving    Re-examination  6/29/20   1:47 PM EDT          Vital Signs:   Vitals:    06/29/20 1306 06/29/20 1314 06/29/20 1500 06/29/20 1615   BP: (!) 141/84  (!) 149/90    Pulse: 108  111 111   Resp: 20 19    Temp: 98.1 °F (36.7 °C)      TempSrc: Oral      SpO2: (!) 84% (!) 88% 95%    Weight: 223 lb (101.2 kg)      Height: 5' (1.524 m)        Card/Pulm:  Rhythm: normal rate. Heart Sounds: Normal S1, S2 and no murmurs, gallops, or rubs. clear to auscultation, no wheezes or rales and unlabored breathing. Capillary Refill: normal.  Radial Pulse:  present 2+. Skin:  Dry, Pink and Warm. Counseling: The emergency provider has spoken with the patient and discussed todays results, in addition to providing specific details for the plan of care and counseling regarding the diagnosis and prognosis. Questions are answered at this time and they are agreeable with the plan.       --------------------------------- IMPRESSION AND DISPOSITION ---------------------------------    IMPRESSION  1.  Opioid overdose, accidental or unintentional, initial encounter ([facility] [place] [facility]    2. Pneumonia due to organism        DISPOSITION  Disposition: AMA  Patient condition is stable    NOTE: This report was transcribed using voice recognition software.  Every effort was made to ensure accuracy; however, inadvertent computerized transcription errors may be present        Denny Cedillo  06/29/20 5022

## 2020-07-14 ENCOUNTER — OFFICE VISIT (OUTPATIENT)
Dept: FAMILY MEDICINE CLINIC | Age: 50
End: 2020-07-14
Payer: MEDICAID

## 2020-07-14 VITALS
HEIGHT: 60 IN | TEMPERATURE: 97.8 F | OXYGEN SATURATION: 100 % | BODY MASS INDEX: 43.86 KG/M2 | RESPIRATION RATE: 22 BRPM | SYSTOLIC BLOOD PRESSURE: 134 MMHG | HEART RATE: 108 BPM | WEIGHT: 223.4 LBS | DIASTOLIC BLOOD PRESSURE: 86 MMHG

## 2020-07-14 LAB
CREATININE URINE POCT: 300
HBA1C MFR BLD: 9 %
MICROALBUMIN/CREAT 24H UR: 80 MG/G{CREAT}
MICROALBUMIN/CREAT UR-RTO: ABNORMAL

## 2020-07-14 PROCEDURE — G8427 DOCREV CUR MEDS BY ELIG CLIN: HCPCS | Performed by: FAMILY MEDICINE

## 2020-07-14 PROCEDURE — 99214 OFFICE O/P EST MOD 30 MIN: CPT | Performed by: FAMILY MEDICINE

## 2020-07-14 PROCEDURE — 83036 HEMOGLOBIN GLYCOSYLATED A1C: CPT | Performed by: FAMILY MEDICINE

## 2020-07-14 PROCEDURE — 2022F DILAT RTA XM EVC RTNOPTHY: CPT | Performed by: FAMILY MEDICINE

## 2020-07-14 PROCEDURE — 3017F COLORECTAL CA SCREEN DOC REV: CPT | Performed by: FAMILY MEDICINE

## 2020-07-14 PROCEDURE — 4004F PT TOBACCO SCREEN RCVD TLK: CPT | Performed by: FAMILY MEDICINE

## 2020-07-14 PROCEDURE — G8417 CALC BMI ABV UP PARAM F/U: HCPCS | Performed by: FAMILY MEDICINE

## 2020-07-14 PROCEDURE — 3052F HG A1C>EQUAL 8.0%<EQUAL 9.0%: CPT | Performed by: FAMILY MEDICINE

## 2020-07-14 PROCEDURE — 82044 UR ALBUMIN SEMIQUANTITATIVE: CPT | Performed by: FAMILY MEDICINE

## 2020-07-14 RX ORDER — BLOOD SUGAR DIAGNOSTIC
STRIP MISCELLANEOUS
COMMUNITY
Start: 2020-06-29 | End: 2020-10-27

## 2020-07-14 RX ORDER — NALOXONE HYDROCHLORIDE 4 MG/.1ML
SPRAY NASAL
COMMUNITY
Start: 2020-06-29

## 2020-07-14 RX ORDER — TRAMADOL HYDROCHLORIDE 50 MG/1
TABLET ORAL
COMMUNITY
Start: 2020-06-01

## 2020-07-14 RX ORDER — BACLOFEN 10 MG/1
TABLET ORAL
Qty: 30 TABLET | Refills: 0 | Status: SHIPPED
Start: 2020-07-14 | End: 2020-08-12 | Stop reason: SDUPTHER

## 2020-07-14 RX ORDER — GLIPIZIDE 5 MG/1
5 TABLET ORAL 2 TIMES DAILY
Qty: 60 TABLET | Refills: 3 | Status: SHIPPED
Start: 2020-07-14 | End: 2020-10-16 | Stop reason: SDUPTHER

## 2020-07-14 ASSESSMENT — ENCOUNTER SYMPTOMS
CONSTIPATION: 0
BACK PAIN: 0
COUGH: 0
SHORTNESS OF BREATH: 0
EYE PAIN: 0
SORE THROAT: 0
ABDOMINAL PAIN: 0
SINUS PRESSURE: 0
DIARRHEA: 0

## 2020-07-14 NOTE — PROGRESS NOTES
Jose Ferguson  : 1970    Chief Complaint:     Chief Complaint   Patient presents with    Diabetes       HPI  Jose Ferguson 47 y.o. presents for   Chief Complaint   Patient presents with    Diabetes     Patient presents for diabetes follow-up today. She states that she was taken off of her metformin and she was placed on insulin when she was in the hospital however they did not give her any insulin upon discharge from the hospital.  She states she has not been taking anything for her diabetes currently. Her A1c is 9. She also states her mood has not been great. She has had much stress at home. She also was in the ER few days prior with possible overdose. She did have fentanyl and cocaine in her system. She is not sure why this was in her system. She also had this prior in March. Patient denies any illicit drug use. She is due for preventative screenings including mammogram and colonoscopy. She has been trying to lose weight as well. She is due for blood work today. All questions were answered to patients satisfaction.     Past Medical History:   Diagnosis Date    Arthritis     Blood transfusion     Bronchitis     Depression     Hypertension     Pneumonia     [facility] 3/[facility]/2020       Past Surgical History:   Procedure Laterality Date    APPENDECTOMY      CHOLECYSTECTOMY      FOOT SURGERY  12    julia \"toes\"    TUBAL LIGATION         Social History     Socioeconomic History    Marital status: Single     Spouse name: None    Number of children: None    Years of education: None    Highest education level: None   Occupational History    None   Social Needs    Financial resource strain: None    Food insecurity     Worry: None     Inability: None    Transportation needs     Medical: None     Non-medical: None   Tobacco Use    Smoking status: Current Every Day Smoker     Packs/day: 0.75     Years: 10.00     Pack years: 7.50     Start date: 7/2/1995    Smokeless tobacco: Never Used   Substance and Sexual Activity    Alcohol use: Yes     Comment: occassional    Drug use: No    Sexual activity: None   Lifestyle    Physical activity     Days per week: None     Minutes per session: None    Stress: None   Relationships    Social connections     Talks on phone: None     Gets together: None     Attends Church service: None     Active member of club or organization: None     Attends meetings of clubs or organizations: None     Relationship status: None    Intimate partner violence     Fear of current or ex partner: None     Emotionally abused: None     Physically abused: None     Forced sexual activity: None   Other Topics Concern    None   Social History Narrative    None       Family History   Problem Relation Age of Onset    Cancer Mother     Hypertension Mother     No Known Problems Father         ALS          Current Outpatient Medications   Medication Sig Dispense Refill    NARCAN 4 MG/0.1ML LIQD nasal spray use as directed by prescriber for 1 dose      ONETOUCH ULTRA strip       traMADol (ULTRAM) 50 MG tablet       glipiZIDE (GLUCOTROL) 5 MG tablet Take 1 tablet by mouth 2 times daily 60 tablet 3    baclofen (LIORESAL) 10 MG tablet Take one tab po bid prn 30 tablet 0    ketoconazole (NIZORAL) 2 % shampoo Apply 5 mLs topically three times a week      nystatin (MYCOSTATIN) 932219 UNIT/GM powder Apply 1 g topically 3 times daily as needed (apply to rash)      sertraline (ZOLOFT) 50 MG tablet Take 1 tablet by mouth daily 30 tablet 3    triamterene-hydrochlorothiazide (MAXZIDE-25) 37.5-25 MG per tablet Take 1 tablet by mouth daily 30 tablet 2    albuterol sulfate HFA (PROVENTIL HFA) 108 (90 Base) MCG/ACT inhaler Inhale 2 puffs into the lungs every 6 hours as needed for Wheezing or Shortness of Breath 1 Inhaler 3    atorvastatin (LIPITOR) 40 MG tablet Take 1 tablet by mouth daily 30 tablet 5    furosemide (LASIX) 20 MG tablet Take 1 tablet by mouth daily as needed (swelling) 30 tablet 5    gabapentin (NEURONTIN) 300 MG capsule Take 300 mg by mouth 3 times daily. 0     No current facility-administered medications for this visit. Allergies   Allergen Reactions    Latex      Swelling, itching    Motrin [Ibuprofen Micronized] Swelling    Advil [Ibuprofen]        Health Maintenance Due   Topic Date Due    Diabetic retinal exam  08/03/1979    HIV screen  08/03/1984    Hepatitis B vaccine (1 of 3 - Risk 3-dose series) 08/03/1988    DTaP/Tdap/Td vaccine (1 - Tdap) 08/03/1988    Cervical cancer screen  08/03/1990    Breast cancer screen  08/03/2019    Shingles Vaccine (1 of 2) 08/03/2019    Colon cancer screen colonoscopy  08/03/2019    Lipid screen  07/02/2020           REVIEW OF SYSTEMS  Review of Systems   Constitutional: Negative for fatigue and fever. HENT: Negative for ear pain, sinus pressure, sneezing and sore throat. Eyes: Negative for pain. Respiratory: Negative for cough and shortness of breath. Cardiovascular: Negative for chest pain and leg swelling. Gastrointestinal: Negative for abdominal pain, constipation and diarrhea. Genitourinary: Negative for dysuria and urgency. Musculoskeletal: Negative for back pain and myalgias. Skin: Negative for rash. Allergic/Immunologic: Negative for food allergies. Neurological: Negative for light-headedness and headaches. Hematological: Does not bruise/bleed easily. Psychiatric/Behavioral: Positive for dysphoric mood. Negative for behavioral problems and sleep disturbance. The patient is nervous/anxious. PHYSICAL EXAM  /86   Pulse 108   Temp 97.8 °F (36.6 °C) (Temporal)   Resp 22   Ht 5' (1.524 m)   Wt 223 lb 6.4 oz (101.3 kg)   SpO2 100%   BMI 43.63 kg/m²   Physical Exam  Vitals signs and nursing note reviewed. Constitutional:       Appearance: She is well-developed.       Comments: obese   HENT:      Head: Normocephalic and atraumatic. Right Ear: External ear normal.      Left Ear: External ear normal.      Nose: No congestion. Mouth/Throat:      Pharynx: No posterior oropharyngeal erythema. Eyes:      Conjunctiva/sclera: Conjunctivae normal.   Neck:      Musculoskeletal: Normal range of motion and neck supple. Cardiovascular:      Rate and Rhythm: Normal rate and regular rhythm. Heart sounds: Normal heart sounds. No murmur. No friction rub. No gallop. Pulmonary:      Effort: Pulmonary effort is normal.      Breath sounds: Normal breath sounds. No wheezing. Abdominal:      Palpations: Abdomen is soft. Tenderness: There is no abdominal tenderness. There is no guarding. Musculoskeletal: Normal range of motion. General: No tenderness. Skin:     General: Skin is warm and dry. Findings: No rash. Neurological:      Mental Status: She is alert and oriented to person, place, and time. Deep Tendon Reflexes: Reflexes are normal and symmetric. Psychiatric:         Mood and Affect: Mood normal.         Behavior: Behavior normal.              Laboratory:   All laboratory and radiology results have been personally reviewed by myself    Lab Results   Component Value Date     06/29/2020    K 3.7 06/29/2020    CL 97 06/29/2020    CO2 19 06/29/2020    BUN 12 06/29/2020    CREATININE 0.7 06/29/2020    PROT 7.5 06/29/2020    LABALBU 3.5 06/29/2020    LABALBU 3.8 03/15/2012    CALCIUM 8.6 06/29/2020    GFRAA >60 06/29/2020    LABGLOM >60 06/29/2020    GLUCOSE 406 06/29/2020    GLUCOSE 124 03/15/2012    AST 24 06/29/2020    ALT 18 06/29/2020    ALKPHOS 78 06/29/2020    BILITOT <0.2 06/29/2020    TSH 1.030 07/02/2019    CHOL 186 07/02/2019    TRIG 97 07/02/2019    HDL 60 07/02/2019    LDLCALC 107 07/02/2019    LABA1C 9 07/14/2020        Lab Results   Component Value Date    CHOL 186 07/02/2019    CHOL 159 01/06/2012     Lab Results   Component Value Date    TRIG 97 07/02/2019    TRIG 51 01/06/2012 Lab Results   Component Value Date    HDL 60 07/02/2019    HDL 61.0 01/06/2012     Lab Results   Component Value Date    LDLCALC 107 (H) 07/02/2019    LDLCALC 88 01/06/2012       Lab Results   Component Value Date    LABA1C 9 07/14/2020    LABA1C 8.6 (H) 03/05/2020    LABA1C 7.8 02/04/2020     Lab Results   Component Value Date    LDLCALC 107 (H) 07/02/2019    CREATININE 0.7 06/29/2020       ASSESSMENT/PLAN:     Diagnosis Orders   1. Type 2 diabetes mellitus without complication, without long-term current use of insulin (HCC)  POCT glycosylated hemoglobin (Hb A1C)    POCT microalbumin    glipiZIDE (GLUCOTROL) 5 MG tablet    CBC Auto Differential    COMPREHENSIVE METABOLIC PANEL   2. Essential hypertension     3. Hyperlipidemia, unspecified hyperlipidemia type  LIPID PANEL   4. Mild episode of recurrent major depressive disorder ([facility]     5. Vitamin D deficiency  Vitamin D 25 Hydroxy   6. Class 3 severe obesity due to excess calories with serious comorbidity and body mass index (BMI) of 40.0 to 44.9 in adult ([facility]     7. Screening for malignant neoplasm of breast  AGUEDA DIGITAL SCREEN BILATERAL PER PROTOCOL   8. Screening for malignant neoplasm of colon performed     9. Screening for malignant neoplasm of colon  Cologuard (For External Results Only)     A1c is 9. Will add glipizide to her regimen. Side effects medication reviewed with patient. She will start checking sugars on a daily basis. She does have microalbuminuria but will reevaluate at next visit and likely start lisinopril at that time. BP well controlled we will continue to monitor. Mood currently down but does not wish for any medication or adjustments at this time. We will continue to monitor and reevaluate at next visit. Discussed diet and exercise in detail today and he/she understands the importance of losing weight for better health. Mammogram ordered as well as Cologuard. Labs to be completed.   No other changes are made at this

## 2020-07-23 ENCOUNTER — TELEPHONE (OUTPATIENT)
Dept: FAMILY MEDICINE CLINIC | Age: 50
End: 2020-07-23

## 2020-07-23 RX ORDER — ALBUTEROL SULFATE 90 UG/1
2 AEROSOL, METERED RESPIRATORY (INHALATION) EVERY 6 HOURS PRN
Qty: 1 INHALER | Refills: 3 | Status: SHIPPED
Start: 2020-07-23 | End: 2020-11-04

## 2020-07-23 NOTE — TELEPHONE ENCOUNTER
Last Appointment:  7/14/2020  Future Appointments   Date Time Provider [place]   8/8/2020 10:00 PM [facility] BEDROOM 3 [facility] [place] H[facility]   8/12/2020  3:15 PM DO Jorge Luis [facility]

## 2020-08-12 RX ORDER — BACLOFEN 10 MG/1
TABLET ORAL
Qty: 30 TABLET | Refills: 3 | Status: SHIPPED
Start: 2020-08-12 | End: 2020-09-03 | Stop reason: SDUPTHER

## 2020-08-24 ENCOUNTER — TELEPHONE (OUTPATIENT)
Dept: FAMILY MEDICINE CLINIC | Age: 50
End: 2020-08-24

## 2020-08-25 RX ORDER — GABAPENTIN 300 MG/1
300 CAPSULE ORAL 3 TIMES DAILY
Qty: 90 CAPSULE | Refills: 0 | Status: SHIPPED
Start: 2020-08-25 | End: 2021-02-02 | Stop reason: SDUPTHER

## 2020-08-25 NOTE — TELEPHONE ENCOUNTER
Please call Cory Cordero she has a question,is this pt on Metformin and Glipizide please call [phone]

## 2020-09-03 ENCOUNTER — VIRTUAL VISIT (OUTPATIENT)
Dept: FAMILY MEDICINE CLINIC | Age: 50
End: 2020-09-03
Payer: MEDICAID

## 2020-09-03 PROCEDURE — G8427 DOCREV CUR MEDS BY ELIG CLIN: HCPCS | Performed by: FAMILY MEDICINE

## 2020-09-03 PROCEDURE — 3017F COLORECTAL CA SCREEN DOC REV: CPT | Performed by: FAMILY MEDICINE

## 2020-09-03 PROCEDURE — 3052F HG A1C>EQUAL 8.0%<EQUAL 9.0%: CPT | Performed by: FAMILY MEDICINE

## 2020-09-03 PROCEDURE — 99214 OFFICE O/P EST MOD 30 MIN: CPT | Performed by: FAMILY MEDICINE

## 2020-09-03 PROCEDURE — 2022F DILAT RTA XM EVC RTNOPTHY: CPT | Performed by: FAMILY MEDICINE

## 2020-09-03 RX ORDER — BACLOFEN 20 MG/1
TABLET ORAL
Qty: 60 TABLET | Refills: 2 | Status: SHIPPED
Start: 2020-09-03 | End: 2021-01-28

## 2020-09-03 RX ORDER — GUAIFENESIN 100 MG/5ML
10 SYRUP ORAL 4 TIMES DAILY PRN
Qty: 1 BOTTLE | Refills: 1 | Status: SHIPPED
Start: 2020-09-03 | End: 2020-10-27

## 2020-09-03 ASSESSMENT — ENCOUNTER SYMPTOMS
SHORTNESS OF BREATH: 0
BACK PAIN: 0
COUGH: 1
SINUS PRESSURE: 0
SORE THROAT: 0
DIARRHEA: 0
CONSTIPATION: 0
ABDOMINAL PAIN: 0
EYE PAIN: 0

## 2020-09-03 NOTE — PROGRESS NOTES
This visit was performed as a virtual video visit using a synchronous, two-way, audio-video telehealth technology platform. Patient identification was verified at the start of the visit, including the patient's telephone number and physical location. I discussed with the patient the nature of our telehealth visits, that:     1. Due to the nature of an audio- video modality, the only components of a physical exam that could be done are the elements supported by direct observation. 2. I would evaluate the patient and recommend diagnostics and treatments based on my assessment. 3. If it was felt that the patient should be evaluated in clinic or an emergency room setting, then they would be directed there. 4. Our sessions are not being recorded and that personal health information is protected. 5. Our team would provide follow up care in person if/when the patient needs it. Patient does agree to proceed with telemedicine consultation. Patient's location: home address in [facility]    Physician  location [place] [address] office   other people involved in call: friend        Time spent: Greater than Not billed by time    This visit was completed virtually using Doxy. me        Brianjoey Basurto  : 1970    Chief Complaint:     Chief Complaint   Patient presents with    Cough       \[facility] [place] [facility]\""  Brian Basurto 45 y.o. presents for   Chief Complaint   Patient presents with    Cough     Patient presents for follow-up today. She states that she continues to have a cough with sputum production. She wishes for a refill of her cough suppressant. This has worked in the past.  She also states her diabetes is under better control. Her last A1c was 9 but since increasing and started a new sugar medicine she has noticed a decrease in her sugar readings. She cannot give me a number today however. She is due for repeat A1c in 6 weeks. She will return for a visit at that point in time.   She otherwise also wishes for an increase in her muscle relaxer. She does continue to smoke. Her blood pressure has been well controlled. She has been taking all of her medications as prescribed    All questions were answered to patients satisfaction.     Past Medical History:   Diagnosis Date    Arthritis     Blood transfusion     Bronchitis     Depression     Hypertension     Pneumonia     Type 2 diabetes mellitus ([facility] [address] 3/4/2020       Past Surgical History:   Procedure Laterality Date    APPENDECTOMY      CHOLECYSTECTOMY      FOOT SURGERY  2/20/12    julia \"toes\"    TUBAL LIGATION  1991       Social History     Socioeconomic History    Marital status: Single     Spouse name: Not on file    Number of children: Not on file    Years of education: Not on file    Highest education level: Not on file   Occupational History    Not on file   Social Needs    Financial resource strain: Not on file    Food insecurity     Worry: Not on file     Inability: Not on file    Transportation needs     Medical: Not on file     Non-medical: Not on file   Tobacco Use    Smoking status: Current Every Day Smoker     Packs/day: 0.75     Years: 10.00     Pack years: 7.50     Start date: 7/2/1995    Smokeless tobacco: Never Used   Substance and Sexual Activity    Alcohol use: Yes     Comment: occassional    Drug use: No    Sexual activity: Not on file   Lifestyle    Physical activity     Days per week: Not on file     Minutes per session: Not on file    Stress: Not on file   Relationships    Social connections     Talks on phone: Not on file     Gets together: Not on file     Attends Amish service: Not on file     Active member of club or organization: Not on file     Attends meetings of clubs or organizations: Not on file     Relationship status: Not on file    Intimate partner violence     Fear of current or ex partner: Not on file     Emotionally abused: Not on file     Physically abused: Not on file     Forced sexual activity: Not on file   Other Topics Concern    Not on file   Social History Narrative    Not on file       Family History   Problem Relation Age of Onset    Cancer Mother     Hypertension Mother     No Known Problems Father         ALS          Current Outpatient Medications   Medication Sig Dispense Refill    guaiFENesin (ALTARUSSIN) 100 MG/5ML syrup Take 10 mLs by mouth 4 times daily as needed for Cough 1 Bottle 1    baclofen (LIORESAL) 20 MG tablet Take one tab po bid prn 60 tablet 2    metFORMIN (GLUCOPHAGE) 850 MG tablet Take 1 tablet by mouth 2 times daily (with meals) 60 tablet 2    gabapentin (NEURONTIN) 300 MG capsule Take 1 capsule by mouth 3 times daily for 30 days. 90 capsule 0    albuterol sulfate HFA (PROVENTIL HFA) 108 (90 Base) MCG/ACT inhaler Inhale 2 puffs into the lungs every 6 hours as needed for Wheezing or Shortness of Breath 1 Inhaler 3    NARCAN 4 MG/0.1ML LIQD nasal spray use as directed by prescriber for 1 dose      ONETOUCH ULTRA strip       traMADol (ULTRAM) 50 MG tablet       glipiZIDE (GLUCOTROL) 5 MG tablet Take 1 tablet by mouth 2 times daily 60 tablet 3    ketoconazole (NIZORAL) 2 % shampoo Apply 5 mLs topically three times a week      nystatin (MYCOSTATIN) 165856 UNIT/GM powder Apply 1 g topically 3 times daily as needed (apply to rash)      sertraline (ZOLOFT) 50 MG tablet Take 1 tablet by mouth daily 30 tablet 3    triamterene-hydrochlorothiazide (MAXZIDE-25) 37.5-25 MG per tablet Take 1 tablet by mouth daily 30 tablet 2    atorvastatin (LIPITOR) 40 MG tablet Take 1 tablet by mouth daily 30 tablet 5    furosemide (LASIX) 20 MG tablet Take 1 tablet by mouth daily as needed (swelling) 30 tablet 5     No current facility-administered medications for this visit.         Allergies   Allergen Reactions    Latex      Swelling, itching    Motrin [Ibuprofen Micronized] Swelling    Advil [Ibuprofen]        Health Maintenance Due   Topic Date Due    Diabetic retinal exam  08/03/1979  HIV screen  08/03/1984    Hepatitis B vaccine (1 of 3 - Risk 3-dose series) 08/03/1988    DTaP/Tdap/Td vaccine (1 - Tdap) 08/03/1988    Cervical cancer screen  08/03/1990    Breast cancer screen  08/03/2019    Shingles Vaccine (1 of 2) 08/03/2019    Colon cancer screen colonoscopy  08/03/2019    Lipid screen  07/02/2020    Diabetic foot exam  08/08/2020    Flu vaccine (1) 09/01/2020           REVIEW OF SYSTEMS  Review of Systems   Constitutional: Negative for fatigue and fever. HENT: Negative for ear pain, sinus pressure, sneezing and sore throat. Eyes: Negative for pain. Respiratory: Positive for cough. Negative for shortness of breath. Cardiovascular: Negative for chest pain and leg swelling. Gastrointestinal: Negative for abdominal pain, constipation and diarrhea. Genitourinary: Negative for dysuria and urgency. Musculoskeletal: Positive for arthralgias. Negative for back pain and myalgias. Skin: Negative for rash. Allergic/Immunologic: Negative for food allergies. Neurological: Negative for light-headedness and headaches. Hematological: Does not bruise/bleed easily. Psychiatric/Behavioral: Negative for behavioral problems and sleep disturbance. PHYSICAL EXAM  There were no vitals taken for this visit. Physical Exam  Vitals signs reviewed. Constitutional:       Appearance: Normal appearance. She is obese. HENT:      Head: Normocephalic. Nose: Nose normal.   Eyes:      Extraocular Movements: Extraocular movements intact. Conjunctiva/sclera: Conjunctivae normal.   Neck:      Musculoskeletal: Normal range of motion. Pulmonary:      Effort: Pulmonary effort is normal.   Musculoskeletal: Normal range of motion. Skin:     Findings: No rash. Neurological:      General: No focal deficit present. Mental Status: She is alert and oriented to person, place, and time. Mental status is at baseline.    Psychiatric:         Mood and Affect: Mood normal. Behavior: Behavior normal.         Thought Content: Thought content normal.         Judgment: Judgment normal.              Laboratory: All laboratory and radiology results have been personally reviewed by myself    Lab Results   Component Value Date     06/29/2020    K 3.7 06/29/2020    CL 97 06/29/2020    CO2 19 06/29/2020    BUN 12 06/29/2020    CREATININE 0.7 06/29/2020    PROT 7.5 06/29/2020    LABALBU 3.5 06/29/2020    LABALBU 3.8 03/15/2012    CALCIUM 8.6 06/29/2020    GFRAA >60 06/29/2020    LABGLOM >60 06/29/2020    GLUCOSE 406 06/29/2020    GLUCOSE 124 03/15/2012    AST 24 06/29/2020    ALT 18 06/29/2020    ALKPHOS 78 06/29/2020    BILITOT <0.2 06/29/2020    TSH 1.030 07/02/2019    CHOL 186 07/02/2019    TRIG 97 07/02/2019    HDL 60 07/02/2019    LDLCALC 107 07/02/2019    LABA1C 9 07/14/2020        Lab Results   Component Value Date    CHOL 186 07/02/2019    CHOL 159 01/06/2012     Lab Results   Component Value Date    TRIG 97 07/02/2019    TRIG 51 01/06/2012     Lab Results   Component Value Date    HDL 60 07/02/2019    HDL 61.0 01/06/2012     Lab Results   Component Value Date    LDLCALC 107 (H) 07/02/2019    LDLCALC 88 01/06/2012       Lab Results   Component Value Date    LABA1C 9 07/14/2020    LABA1C 8.6 (H) 03/05/2020    LABA1C 7.8 02/04/2020     Lab Results   Component Value Date    LDLCALC 107 (H) 07/02/2019    CREATININE 0.7 06/29/2020       ASSESSMENT/PLAN:     Diagnosis Orders   1. Type 2 diabetes mellitus without complication, without long-term current use of insulin ([address]     2. Essential hypertension     3. Cough     4. Tobacco dependency       Patient states her sugars are under better control will continue current therapy for now and have her return in 6 weeks for an A1c check. BP under good control continue current therapy. Cough suppressant given today. She continues to smoke. Baclofen increased to 20 twice daily as needed. Side effects reviewed with patient.  Do not drive while taking this medication. Problem list reviewed andsimplified/updated  HM reviewed today and counseled as appropriate    Call or go to ED immediately if symptoms worsen or persist.  Future Appointments   Date Time Provider Francis Mansfield   10/21/2020  2:30 PM DO Jorge Luis Geiger [facility]     Or sooner if necessary. Educational materials and/or homeexercises printed for patient's review and were included in patient instructions on his/her After Visit Summary and given to patient at the end of visit. Counseled regarding above diagnosis, including possible risks and complications,  especially if left uncontrolled. Counseled regarding the possible side effects, risks, benefits and alternatives to treatment; patient and/or guardian verbalizes understanding, agrees,feels comfortable with and wishes to proceed with above treatment plan. Advised patient to call Vergia Cota new medication issues, and read all Rx info from pharmacy to assure aware of all possible risks and side effects of medication before taking. Reviewed age and gender appropriate health screening exams and vaccinations. Advised patient regarding importance of keeping up with recommended health maintenance and toschedule as soon as possible if overdue, as this is important in assessing for undiagnosed pathology, especially cancer, as well as protecting against potentially harmful/life threatening disease. Patient and/or guardian verbalizes understanding and agrees with above counseling, assessment and plan. All questions answered. Jing Justin DO  9/3/20    NOTE: This report was transcribed using voice recognition software. Every effort was made to ensure accuracy; however, inadvertent computerized transcription errors may be present    Velma Bennett is a 45 y.o. female being evaluated by a Virtual Visit (video visit) encounter to address concerns as mentioned above.   A caregiver was present when appropriate. Due to this being a TeleHealth encounter ([facility]), evaluation of the following organ systems was limited: Vitals/Constitutional/EENT/Resp/CV/GI//MS/Neuro/Skin/Heme-Lymph-Imm. Pursuant to the emergency declaration under the [address] [place] [address], [address] [place] [address] and the [employer] and [employer] Act, this Virtual Visit was conducted with patient's (and/or legal guardian's) consent, to reduce the patient's risk of exposure to COVID-19 and provide necessary medical care. The patient (and/or legal guardian) has also been advised to contact this office for worsening conditions or problems, and seek emergency medical treatment and/or call 911 if deemed necessary.

## 2020-09-09 RX ORDER — KETOCONAZOLE 20 MG/ML
5 SHAMPOO TOPICAL
Qty: 1 BOTTLE | Refills: 1 | Status: SHIPPED
Start: 2020-09-09 | End: 2020-11-04

## 2020-10-16 RX ORDER — TRIAMTERENE AND HYDROCHLOROTHIAZIDE 37.5; 25 MG/1; MG/1
1 TABLET ORAL DAILY
Qty: 30 TABLET | Refills: 2 | Status: SHIPPED
Start: 2020-10-16 | End: 2021-02-02 | Stop reason: SDUPTHER

## 2020-10-16 RX ORDER — GLIPIZIDE 5 MG/1
5 TABLET ORAL 2 TIMES DAILY
Qty: 60 TABLET | Refills: 3 | Status: SHIPPED
Start: 2020-10-16 | End: 2020-10-27

## 2020-10-27 RX ORDER — BLOOD SUGAR DIAGNOSTIC
STRIP MISCELLANEOUS
Qty: 100 EACH | Refills: 0 | Status: SHIPPED
Start: 2020-10-27 | End: 2021-01-28

## 2020-10-27 RX ORDER — GUAIFENESIN 100 MG/5ML
LIQUID ORAL
Qty: 100 ML | Refills: 2 | Status: SHIPPED
Start: 2020-10-27 | End: 2021-01-28

## 2020-10-27 RX ORDER — GLIPIZIDE 5 MG/1
TABLET ORAL
Qty: 60 TABLET | Refills: 2 | Status: SHIPPED
Start: 2020-10-27 | End: 2020-12-30

## 2020-11-04 RX ORDER — ALBUTEROL SULFATE 90 UG/1
2 AEROSOL, METERED RESPIRATORY (INHALATION) EVERY 6 HOURS PRN
Qty: 1 INHALER | Refills: 2 | Status: SHIPPED
Start: 2020-11-04 | End: 2021-02-26

## 2020-11-04 RX ORDER — KETOCONAZOLE 20 MG/ML
5 SHAMPOO TOPICAL
Qty: 1 BOTTLE | Refills: 2 | Status: SHIPPED
Start: 2020-11-04 | End: 2021-02-02 | Stop reason: SDUPTHER

## 2020-12-30 RX ORDER — TRIAMTERENE AND HYDROCHLOROTHIAZIDE 37.5; 25 MG/1; MG/1
CAPSULE ORAL
Qty: 30 CAPSULE | Refills: 2 | Status: SHIPPED
Start: 2020-12-30 | End: 2021-03-25

## 2020-12-30 RX ORDER — ATORVASTATIN CALCIUM 40 MG/1
40 TABLET, FILM COATED ORAL DAILY
Qty: 30 TABLET | Refills: 6 | Status: SHIPPED
Start: 2020-12-30 | End: 2021-07-23

## 2020-12-30 RX ORDER — GLIPIZIDE 5 MG/1
TABLET ORAL
Qty: 60 TABLET | Refills: 2 | Status: SHIPPED
Start: 2020-12-30 | End: 2021-02-02 | Stop reason: SDUPTHER

## 2021-01-28 RX ORDER — GUAIFENESIN 100 MG/5ML
SYRUP ORAL
Qty: 120 ML | Refills: 2 | Status: SHIPPED
Start: 2021-01-28 | End: 2021-11-02 | Stop reason: ALTCHOICE

## 2021-01-28 RX ORDER — BLOOD SUGAR DIAGNOSTIC
STRIP MISCELLANEOUS
Qty: 100 EACH | Refills: 1 | Status: SHIPPED
Start: 2021-01-28 | End: 2021-04-23

## 2021-01-28 RX ORDER — BACLOFEN 20 MG/1
TABLET ORAL
Qty: 60 TABLET | Refills: 3 | Status: SHIPPED
Start: 2021-01-28 | End: 2021-06-22

## 2021-02-02 ENCOUNTER — OFFICE VISIT (OUTPATIENT)
Dept: FAMILY MEDICINE CLINIC | Age: 51
End: 2021-02-02
Payer: MEDICAID

## 2021-02-02 VITALS
BODY MASS INDEX: 44.65 KG/M2 | HEIGHT: 60 IN | SYSTOLIC BLOOD PRESSURE: 122 MMHG | TEMPERATURE: 97.9 F | WEIGHT: 227.4 LBS | OXYGEN SATURATION: 98 % | HEART RATE: 120 BPM | RESPIRATION RATE: 18 BRPM | DIASTOLIC BLOOD PRESSURE: 66 MMHG

## 2021-02-02 DIAGNOSIS — E66.01 CLASS 3 SEVERE OBESITY DUE TO EXCESS CALORIES WITH SERIOUS COMORBIDITY AND BODY MASS INDEX (BMI) OF 40.0 TO 44.9 IN ADULT (HCC): ICD-10-CM

## 2021-02-02 DIAGNOSIS — E11.9 TYPE 2 DIABETES MELLITUS WITHOUT COMPLICATION, WITHOUT LONG-TERM CURRENT USE OF INSULIN (HCC): Primary | ICD-10-CM

## 2021-02-02 DIAGNOSIS — Z12.31 SCREENING MAMMOGRAM, ENCOUNTER FOR: ICD-10-CM

## 2021-02-02 DIAGNOSIS — Z72.0 TOBACCO ABUSE: ICD-10-CM

## 2021-02-02 DIAGNOSIS — I10 ESSENTIAL HYPERTENSION: ICD-10-CM

## 2021-02-02 DIAGNOSIS — E78.5 HYPERLIPIDEMIA, UNSPECIFIED HYPERLIPIDEMIA TYPE: ICD-10-CM

## 2021-02-02 DIAGNOSIS — E55.9 VITAMIN D DEFICIENCY: ICD-10-CM

## 2021-02-02 LAB — HBA1C MFR BLD: 8.4 %

## 2021-02-02 PROCEDURE — 99214 OFFICE O/P EST MOD 30 MIN: CPT | Performed by: FAMILY MEDICINE

## 2021-02-02 PROCEDURE — 3052F HG A1C>EQUAL 8.0%<EQUAL 9.0%: CPT | Performed by: FAMILY MEDICINE

## 2021-02-02 PROCEDURE — G8427 DOCREV CUR MEDS BY ELIG CLIN: HCPCS | Performed by: FAMILY MEDICINE

## 2021-02-02 PROCEDURE — 4004F PT TOBACCO SCREEN RCVD TLK: CPT | Performed by: FAMILY MEDICINE

## 2021-02-02 PROCEDURE — 2022F DILAT RTA XM EVC RTNOPTHY: CPT | Performed by: FAMILY MEDICINE

## 2021-02-02 PROCEDURE — G8417 CALC BMI ABV UP PARAM F/U: HCPCS | Performed by: FAMILY MEDICINE

## 2021-02-02 PROCEDURE — G8484 FLU IMMUNIZE NO ADMIN: HCPCS | Performed by: FAMILY MEDICINE

## 2021-02-02 PROCEDURE — 3017F COLORECTAL CA SCREEN DOC REV: CPT | Performed by: FAMILY MEDICINE

## 2021-02-02 PROCEDURE — 83036 HEMOGLOBIN GLYCOSYLATED A1C: CPT | Performed by: FAMILY MEDICINE

## 2021-02-02 RX ORDER — KETOCONAZOLE 20 MG/ML
5 SHAMPOO TOPICAL
Qty: 1 BOTTLE | Refills: 2 | Status: SHIPPED
Start: 2021-02-03 | End: 2021-03-25

## 2021-02-02 RX ORDER — SERTRALINE HYDROCHLORIDE 100 MG/1
100 TABLET, FILM COATED ORAL DAILY
Qty: 30 TABLET | Refills: 2 | Status: SHIPPED | OUTPATIENT
Start: 2021-02-02

## 2021-02-02 RX ORDER — CROMOLYN SODIUM 40 MG/ML
SOLUTION/ DROPS OPHTHALMIC
COMMUNITY
Start: 2020-11-16

## 2021-02-02 RX ORDER — CEFDINIR 300 MG/1
300 CAPSULE ORAL 2 TIMES DAILY
Qty: 14 CAPSULE | Refills: 0 | Status: SHIPPED | OUTPATIENT
Start: 2021-02-02 | End: 2021-02-09

## 2021-02-02 RX ORDER — GLIPIZIDE 5 MG/1
TABLET ORAL
Qty: 90 TABLET | Refills: 2 | Status: SHIPPED
Start: 2021-02-02 | End: 2021-02-16

## 2021-02-02 RX ORDER — GABAPENTIN 300 MG/1
300 CAPSULE ORAL 3 TIMES DAILY
Qty: 90 CAPSULE | Refills: 0 | Status: SHIPPED | OUTPATIENT
Start: 2021-02-02 | End: 2021-03-04

## 2021-02-02 RX ORDER — FUROSEMIDE 20 MG/1
20 TABLET ORAL DAILY PRN
Qty: 30 TABLET | Refills: 5 | Status: SHIPPED
Start: 2021-02-02 | End: 2021-06-23

## 2021-02-02 ASSESSMENT — PATIENT HEALTH QUESTIONNAIRE - PHQ9
9. THOUGHTS THAT YOU WOULD BE BETTER OFF DEAD, OR OF HURTING YOURSELF: 0
4. FEELING TIRED OR HAVING LITTLE ENERGY: 3
2. FEELING DOWN, DEPRESSED OR HOPELESS: 3
10. IF YOU CHECKED OFF ANY PROBLEMS, HOW DIFFICULT HAVE THESE PROBLEMS MADE IT FOR YOU TO DO YOUR WORK, TAKE CARE OF THINGS AT HOME, OR GET ALONG WITH OTHER PEOPLE: 2
8. MOVING OR SPEAKING SO SLOWLY THAT OTHER PEOPLE COULD HAVE NOTICED. OR THE OPPOSITE, BEING SO FIGETY OR RESTLESS THAT YOU HAVE BEEN MOVING AROUND A LOT MORE THAN USUAL: 1
3. TROUBLE FALLING OR STAYING ASLEEP: 3
SUM OF ALL RESPONSES TO PHQ QUESTIONS 1-9: 15

## 2021-02-02 NOTE — PROGRESS NOTES
Leena Rubin  : 1970    Chief Complaint:     Chief Complaint   Patient presents with    Diabetes       HPI  Leena Rubin 45 y.o. presents for   Chief Complaint   Patient presents with    Diabetes     Patient presents for follow-up today. Her last A1c was elevated at 9. She states she has been taking all of her medications as prescribed. She has been trying to watch her diet. Her A1c today is at 8.4. She did not get her blood work completed that was ordered at last visit. Her BP is well controlled today. She does continue to smoke. She is currently on a statin which she is tolerating. She has taken vitamin D supplementation. She has gained some weight since last visit but admits to much stress in her life currently. All questions were answered to patients satisfaction.     Past Medical History:   Diagnosis Date    Arthritis     Blood transfusion     Bronchitis     Depression     Hypertension     Pneumonia     [facility]       Past Surgical History:   Procedure Laterality Date    APPENDECTOMY      CHOLECYSTECTOMY      FOOT SURGERY  12    julia \"toes\"    TUBAL LIGATION         Social History     Socioeconomic History    Marital status: Single     Spouse name: None    Number of children: None    Years of education: None    Highest education level: None   Occupational History    None   Social Needs    Financial resource strain: None    Food insecurity     Worry: None     Inability: None    Transportation needs     Medical: None     Non-medical: None   Tobacco Use    Smoking status: Current Every Day Smoker     Packs/day: 0.75     Years: 10.00     Pack years: 7.50     Start date: 1995    Smokeless tobacco: Never Used   Substance and Sexual Activity    Alcohol use: Yes     Comment: occassional    Drug use: No    Sexual activity: None   Lifestyle    Physical activity     Days per week: None     Minutes per session: None  Stress: None   Relationships    Social connections     Talks on phone: None     Gets together: None     Attends Yazidi service: None     Active member of club or organization: None     Attends meetings of clubs or organizations: None     Relationship status: None    Intimate partner violence     Fear of current or ex partner: None     Emotionally abused: None     Physically abused: None     Forced sexual activity: None   Other Topics Concern    None   Social History Narrative    None       Family History   Problem Relation Age of Onset    Cancer Mother     Hypertension Mother     No Known Problems Father         ALS          Current Outpatient Medications   Medication Sig Dispense Refill    ketoconazole (NIZORAL) 2 % shampoo Apply 5 mLs topically three times a week 1 Bottle 2    furosemide (LASIX) 20 MG tablet Take 1 tablet by mouth daily as needed (swelling) 30 tablet 5    gabapentin (NEURONTIN) 300 MG capsule Take 1 capsule by mouth 3 times daily for 30 days.  90 capsule 0    glipiZIDE (GLUCOTROL) 5 MG tablet TAKE 2 TABLET BY MOUTH in am and one tab in pm 90 tablet 2    sertraline (ZOLOFT) 100 MG tablet Take 1 tablet by mouth daily 30 tablet 2    cefdinir (OMNICEF) 300 MG capsule Take 1 capsule by mouth 2 times daily for 7 days 14 capsule 0    baclofen (LIORESAL) 20 MG tablet TAKE 1 TABLET BY MOUTH TWICE DAILY AS NEEDED 60 tablet 3    ONETOUCH ULTRA strip CHECK BLOOD SUGAR AS DIRECTED  100 each 1    ROBAFEN 100 MG/5ML syrup TAKE (10 ML) BY MOUTH 4 TIMES A DAY AS NEEDED FOR COUGH  120 mL 2    triamterene-hydroCHLOROthiazide (DYAZIDE) 37.5-25 MG per capsule TAKE 1 CAPSULE BY MOUTH DAILY  30 capsule 2    atorvastatin (LIPITOR) 40 MG tablet TAKE 1 TABLET BY MOUTH DAILY  30 tablet 6    albuterol sulfate  (90 Base) MCG/ACT inhaler Inhale 2 puffs into the lungs every 6 hours as needed for wheezing or shortness of breath 1 Inhaler 2    NARCAN 4 MG/0.1ML LIQD nasal spray use as directed by prescriber for 1 dose      traMADol (ULTRAM) 50 MG tablet       nystatin (MYCOSTATIN) 040109 UNIT/GM powder Apply 1 g topically 3 times daily as needed (apply to rash)      cromolyn (OPTICROM) 4 % ophthalmic solution INSTILL 1 DROP IN BOTH EYES TWICE DAILY       No current facility-administered medications for this visit. Allergies   Allergen Reactions    Latex      Swelling, itching    Motrin [Ibuprofen Micronized] Swelling    Advil [Ibuprofen]        Health Maintenance Due   Topic Date Due    Hepatitis C screen  1970    Diabetic retinal exam  08/03/1979    HIV screen  08/03/1984    Hepatitis B vaccine (1 of 3 - Risk 3-dose series) 08/03/1988    DTaP/Tdap/Td vaccine (1 - Tdap) 08/03/1988    Cervical cancer screen  08/03/1990    Breast cancer screen  08/03/2019    Shingles Vaccine (1 of 2) 08/03/2019    Colon cancer screen colonoscopy  08/03/2019    Lipid screen  07/02/2020    Flu vaccine (1) 09/01/2020           REVIEW OF SYSTEMS  Review of Systems   Constitutional: Negative for fatigue and fever. HENT: Negative for ear pain, sinus pressure, sneezing and sore throat. Eyes: Negative for pain. Respiratory: Negative for cough and shortness of breath. Cardiovascular: Negative for chest pain and leg swelling. Gastrointestinal: Negative for abdominal pain, constipation and diarrhea. Genitourinary: Negative for dysuria and urgency. Musculoskeletal: Negative for back pain and myalgias. Skin: Negative for rash. Allergic/Immunologic: Negative for food allergies. Neurological: Negative for light-headedness and headaches. Hematological: Does not bruise/bleed easily. Psychiatric/Behavioral: Negative for behavioral problems and sleep disturbance. The patient is nervous/anxious.         PHYSICAL EXAM  /66   Pulse 120   Temp 97.9 °F (36.6 °C)   Resp 18   Ht 5' (1.524 m)   Wt 227 lb 6.4 oz (103.1 kg)   SpO2 98%   BMI 44.41 kg/m²   Physical Exam  Vitals signs and nursing note reviewed. Constitutional:       Appearance: She is well-developed. Comments: obese   HENT:      Head: Normocephalic and atraumatic. Right Ear: External ear normal.      Left Ear: External ear normal.      Nose: No congestion. Mouth/Throat:      Pharynx: No posterior oropharyngeal erythema. Eyes:      Conjunctiva/sclera: Conjunctivae normal.   Neck:      Musculoskeletal: Normal range of motion and neck supple. Cardiovascular:      Rate and Rhythm: Normal rate and regular rhythm. Heart sounds: Normal heart sounds. No murmur. No friction rub. No gallop. Pulmonary:      Effort: Pulmonary effort is normal.      Breath sounds: Normal breath sounds. No wheezing. Abdominal:      Palpations: Abdomen is soft. Tenderness: There is no abdominal tenderness. There is no guarding. Musculoskeletal: Normal range of motion. General: No tenderness. Skin:     General: Skin is warm and dry. Findings: No rash. Neurological:      Mental Status: She is alert and oriented to person, place, and time. Deep Tendon Reflexes: Reflexes are normal and symmetric. Psychiatric:         Mood and Affect: Mood normal.         Behavior: Behavior normal.              Laboratory:   All laboratory and radiology results have been personally reviewed by myself    Lab Results   Component Value Date     06/29/2020    K 3.7 06/29/2020    CL 97 06/29/2020    CO2 19 06/29/2020    BUN 12 06/29/2020    CREATININE 0.7 06/29/2020    PROT 7.5 06/29/2020    LABALBU 3.5 06/29/2020    LABALBU 3.8 03/15/2012    CALCIUM 8.6 06/29/2020    GFRAA >60 06/29/2020    LABGLOM >60 06/29/2020    GLUCOSE 406 06/29/2020    GLUCOSE 124 03/15/2012    AST 24 06/29/2020    ALT 18 06/29/2020    ALKPHOS 78 06/29/2020    BILITOT <0.2 06/29/2020    TSH 1.030 07/02/2019    CHOL 186 07/02/2019    TRIG 97 07/02/2019    HDL 60 07/02/2019    LDLCALC 107 07/02/2019    LABA1C 8.4 02/02/2021        Lab Results   Component Value Date    CHOL 186 07/02/2019    CHOL 159 01/06/2012     Lab Results   Component Value Date    TRIG 97 07/02/2019    TRIG 51 01/06/2012     Lab Results   Component Value Date    HDL 60 07/02/2019    HDL 61.0 01/06/2012     Lab Results   Component Value Date    LDLCALC 107 (H) 07/02/2019    LDLCALC 88 01/06/2012       Lab Results   Component Value Date    LABA1C 8.4 02/02/2021    LABA1C 9 07/14/2020    LABA1C 8.6 (H) 03/05/2020     Lab Results   Component Value Date    LDLCALC 107 (H) 07/02/2019    CREATININE 0.7 06/29/2020       ASSESSMENT/PLAN:     Diagnosis Orders   1. Type 2 diabetes mellitus without complication, without long-term current use of insulin (HCC)  POCT glycosylated hemoglobin (Hb A1C)    glipiZIDE (GLUCOTROL) 5 MG tablet    Diabetic Foot Exam   2. Essential hypertension     3. Tobacco abuse     4. Hyperlipidemia, unspecified hyperlipidemia type     5. Vitamin D deficiency     6. Class 3 severe obesity due to excess calories with serious comorbidity and body mass index (BMI) of 40.0 to 44.9 in adult ([facility] [address]     7. Screening mammogram, encounter for  AGUEDA DIGITAL SCREEN BILATERAL PER PROTOCOL     Increase glipizide to 10 mg in the morning and 5 mg at night. Foot exam completed today within normal limits. She will follow-up in 3 months for reevaluation. BP well controlled continue current therapy. Patient was counseled on smoking cessation and will call if he/she wishes to quit. On statin doing well. Continue vitamin D supplementation. Discussed diet and exercise in detail today and he/she understands the importance of losing weight for better health. Problem list reviewed andsimplified/updated  HM reviewed today and counseled as appropriate    Call or go to ED immediately if symptoms worsen or persist.  Future Appointments   Date Time Provider Francis Mansfield   5/5/2021  2:30 PM DO Jorge Luis Geiger [facility]     Or sooner if necessary.       Educational materials and/or homeexercises printed for patient's review and were included in patient instructions on his/her After Visit Summary and given to patient at the end of visit.       Counseled regarding above diagnosis, including possible risks and complications,  especially if left uncontrolled.     Counseled regarding the possible side effects, risks, benefits and alternatives to treatment; patient and/or guardian verbalizes understanding, agrees,feels comfortable with and wishes to proceed with above treatment plan.     Advised patient to call Kendal Graham new medication issues, and read all Rx info from pharmacy to assure aware of all possible risks and side effects of medication before taking.     Reviewed age and gender appropriate health screening exams and vaccinations. Advised patient regarding importance of keeping up with recommended health maintenance and toschedule as soon as possible if overdue, as this is important in assessing for undiagnosed pathology, especially cancer, as well as protecting against potentially harmful/life threatening disease.          Patient and/or guardian verbalizes understanding and agrees with above counseling, assessment and plan.     All questions answered. Jing 5, DO  2/2/21    NOTE: This report was transcribed using voice recognition software.  Every effort was made to ensure accuracy; however, inadvertent computerized transcription errors may be present

## 2021-02-03 ASSESSMENT — ENCOUNTER SYMPTOMS
SHORTNESS OF BREATH: 0
COUGH: 0
CONSTIPATION: 0
EYE PAIN: 0
ABDOMINAL PAIN: 0
SORE THROAT: 0
DIARRHEA: 0
SINUS PRESSURE: 0
BACK PAIN: 0

## 2021-02-15 ENCOUNTER — TELEPHONE (OUTPATIENT)
Dept: FAMILY MEDICINE CLINIC | Age: 51
End: 2021-02-15

## 2021-02-15 DIAGNOSIS — E11.9 TYPE 2 DIABETES MELLITUS WITHOUT COMPLICATION, WITHOUT LONG-TERM CURRENT USE OF INSULIN (HCC): ICD-10-CM

## 2021-02-15 NOTE — TELEPHONE ENCOUNTER
Pt calling in and states that she was given Cefdinir on 2/3/21 for sinus infection. She states she still has sinus pressure in face and around eyes, still congested. She is asking if another antibiotic can be called in.

## 2021-02-16 RX ORDER — GLIPIZIDE 5 MG/1
TABLET ORAL
Qty: 60 TABLET | Refills: 2 | Status: SHIPPED
Start: 2021-02-16 | End: 2021-06-23

## 2021-02-26 ENCOUNTER — TELEPHONE (OUTPATIENT)
Dept: FAMILY MEDICINE CLINIC | Age: 51
End: 2021-02-26

## 2021-02-26 RX ORDER — ALBUTEROL SULFATE 90 UG/1
2 AEROSOL, METERED RESPIRATORY (INHALATION) EVERY 6 HOURS PRN
Qty: 18 G | Refills: 3 | Status: SHIPPED
Start: 2021-02-26 | End: 2021-06-23

## 2021-02-26 RX ORDER — NYSTATIN 100000 [USP'U]/G
POWDER TOPICAL
Qty: 15 G | Refills: 11 | Status: SHIPPED
Start: 2021-02-26 | End: 2021-02-26 | Stop reason: SDUPTHER

## 2021-02-26 NOTE — TELEPHONE ENCOUNTER
[facility] called left a voice message requesting refills for the pt, Ceftin R, and Nystop gram powder , please send

## 2021-03-01 RX ORDER — NYSTATIN 100000 [USP'U]/G
POWDER TOPICAL
Qty: 15 G | Refills: 11 | Status: SHIPPED | OUTPATIENT
Start: 2021-03-01

## 2021-03-25 RX ORDER — TRIAMTERENE AND HYDROCHLOROTHIAZIDE 37.5; 25 MG/1; MG/1
CAPSULE ORAL
Qty: 30 CAPSULE | Refills: 2 | Status: SHIPPED
Start: 2021-03-25 | End: 2021-06-23

## 2021-03-25 RX ORDER — KETOCONAZOLE 20 MG/ML
5 SHAMPOO TOPICAL
Qty: 120 ML | Refills: 3 | Status: SHIPPED
Start: 2021-03-26 | End: 2021-07-13

## 2021-04-23 RX ORDER — BLOOD SUGAR DIAGNOSTIC
STRIP MISCELLANEOUS
Qty: 100 EACH | Refills: 1 | Status: SHIPPED
Start: 2021-04-23 | End: 2021-07-13

## 2021-04-26 ENCOUNTER — TELEPHONE (OUTPATIENT)
Dept: FAMILY MEDICINE CLINIC | Age: 51
End: 2021-04-26

## 2021-04-26 NOTE — TELEPHONE ENCOUNTER
[facility] calling to find out how many times a day pt tests BS. Please return her callClancy Cost.

## 2021-06-07 ENCOUNTER — TELEPHONE (OUTPATIENT)
Dept: FAMILY MEDICINE CLINIC | Age: 51
End: 2021-06-07

## 2021-06-22 RX ORDER — BACLOFEN 20 MG/1
TABLET ORAL
Qty: 60 TABLET | Refills: 4 | Status: SHIPPED
Start: 2021-06-22 | End: 2021-11-02

## 2021-06-23 DIAGNOSIS — E11.9 TYPE 2 DIABETES MELLITUS WITHOUT COMPLICATION, WITHOUT LONG-TERM CURRENT USE OF INSULIN (HCC): ICD-10-CM

## 2021-06-23 RX ORDER — GLIPIZIDE 5 MG/1
TABLET ORAL
Qty: 90 TABLET | Refills: 2 | Status: SHIPPED
Start: 2021-06-23 | End: 2021-09-21

## 2021-06-23 RX ORDER — TRIAMTERENE AND HYDROCHLOROTHIAZIDE 37.5; 25 MG/1; MG/1
CAPSULE ORAL
Qty: 30 CAPSULE | Refills: 2 | Status: SHIPPED
Start: 2021-06-23 | End: 2021-09-21

## 2021-06-23 RX ORDER — ALBUTEROL SULFATE 90 UG/1
2 AEROSOL, METERED RESPIRATORY (INHALATION) EVERY 6 HOURS PRN
Qty: 18 G | Refills: 3 | Status: SHIPPED | OUTPATIENT
Start: 2021-06-23

## 2021-06-23 RX ORDER — FUROSEMIDE 20 MG/1
20 TABLET ORAL DAILY PRN
Qty: 30 TABLET | Refills: 5 | Status: SHIPPED
Start: 2021-06-23 | End: 2021-12-20

## 2021-07-13 RX ORDER — BLOOD SUGAR DIAGNOSTIC
STRIP MISCELLANEOUS
Qty: 100 EACH | Refills: 1 | Status: SHIPPED | OUTPATIENT
Start: 2021-07-13

## 2021-07-13 RX ORDER — KETOCONAZOLE 20 MG/ML
5 SHAMPOO TOPICAL
Qty: 120 ML | Refills: 2 | Status: SHIPPED
Start: 2021-07-14 | End: 2021-12-28

## 2021-08-09 ENCOUNTER — OFFICE VISIT (OUTPATIENT)
Dept: PRIMARY CARE CLINIC | Age: 51
End: 2021-08-09
Payer: MEDICAID

## 2021-08-09 VITALS
BODY MASS INDEX: 43.39 KG/M2 | WEIGHT: 221 LBS | OXYGEN SATURATION: 100 % | HEART RATE: 112 BPM | RESPIRATION RATE: 20 BRPM | TEMPERATURE: 97.3 F | DIASTOLIC BLOOD PRESSURE: 98 MMHG | SYSTOLIC BLOOD PRESSURE: 152 MMHG | HEIGHT: 60 IN

## 2021-08-09 DIAGNOSIS — L02.419 ABSCESS, AXILLA: ICD-10-CM

## 2021-08-09 DIAGNOSIS — J34.9 SINUS PROBLEM: Primary | ICD-10-CM

## 2021-08-09 DIAGNOSIS — J10.1 INFLUENZA A: ICD-10-CM

## 2021-08-09 LAB
INFLUENZA A ANTIBODY: POSITIVE
INFLUENZA B ANTIBODY: NEGATIVE
Lab: NORMAL
PERFORMING INSTRUMENT: NORMAL
QC PASS/FAIL: NORMAL
SARS-COV-2, POC: NORMAL

## 2021-08-09 PROCEDURE — 99214 OFFICE O/P EST MOD 30 MIN: CPT | Performed by: FAMILY MEDICINE

## 2021-08-09 PROCEDURE — G8427 DOCREV CUR MEDS BY ELIG CLIN: HCPCS | Performed by: FAMILY MEDICINE

## 2021-08-09 PROCEDURE — 4004F PT TOBACCO SCREEN RCVD TLK: CPT | Performed by: FAMILY MEDICINE

## 2021-08-09 PROCEDURE — 3017F COLORECTAL CA SCREEN DOC REV: CPT | Performed by: FAMILY MEDICINE

## 2021-08-09 PROCEDURE — 87426 SARSCOV CORONAVIRUS AG IA: CPT | Performed by: FAMILY MEDICINE

## 2021-08-09 PROCEDURE — G8417 CALC BMI ABV UP PARAM F/U: HCPCS | Performed by: FAMILY MEDICINE

## 2021-08-09 PROCEDURE — 87804 INFLUENZA ASSAY W/OPTIC: CPT | Performed by: FAMILY MEDICINE

## 2021-08-09 RX ORDER — FLUTICASONE PROPIONATE 50 MCG
2 SPRAY, SUSPENSION (ML) NASAL DAILY
Qty: 1 BOTTLE | Refills: 0 | Status: SHIPPED | OUTPATIENT
Start: 2021-08-09

## 2021-08-09 RX ORDER — LORATADINE 10 MG/1
10 TABLET ORAL DAILY
Qty: 30 TABLET | Refills: 0 | Status: SHIPPED | OUTPATIENT
Start: 2021-08-09 | End: 2021-09-08

## 2021-08-09 RX ORDER — CHLORHEXIDINE GLUCONATE 0.12 MG/ML
RINSE ORAL
COMMUNITY
Start: 2021-05-05

## 2021-08-09 RX ORDER — BENZONATATE 200 MG/1
200 CAPSULE ORAL 3 TIMES DAILY PRN
Qty: 30 CAPSULE | Refills: 0 | Status: SHIPPED | OUTPATIENT
Start: 2021-08-09 | End: 2021-08-16

## 2021-08-09 RX ORDER — SULFAMETHOXAZOLE AND TRIMETHOPRIM 800; 160 MG/1; MG/1
1 TABLET ORAL 2 TIMES DAILY
Qty: 14 TABLET | Refills: 0 | Status: SHIPPED | OUTPATIENT
Start: 2021-08-09 | End: 2021-08-16

## 2021-08-09 SDOH — ECONOMIC STABILITY: FOOD INSECURITY: WITHIN THE PAST 12 MONTHS, THE FOOD YOU BOUGHT JUST DIDN'T LAST AND YOU DIDN'T HAVE MONEY TO GET MORE.: PATIENT DECLINED

## 2021-08-09 SDOH — ECONOMIC STABILITY: FOOD INSECURITY: WITHIN THE PAST 12 MONTHS, YOU WORRIED THAT YOUR FOOD WOULD RUN OUT BEFORE YOU GOT MONEY TO BUY MORE.: PATIENT DECLINED

## 2021-08-09 ASSESSMENT — ENCOUNTER SYMPTOMS
PHOTOPHOBIA: 0
RHINORRHEA: 1
SHORTNESS OF BREATH: 0
SINUS PRESSURE: 1
EYE REDNESS: 0
COUGH: 0
EYE DISCHARGE: 0

## 2021-08-09 ASSESSMENT — SOCIAL DETERMINANTS OF HEALTH (SDOH): HOW HARD IS IT FOR YOU TO PAY FOR THE VERY BASICS LIKE FOOD, HOUSING, MEDICAL CARE, AND HEATING?: PATIENT DECLINED

## 2021-08-09 NOTE — PROGRESS NOTES
Luly Lujan (:  1970) is a 45 y.o. female,Established patient, here for evaluation of the following chief complaint(s):  Sinus Problem         ASSESSMENT/PLAN:  1. Sinus problem  -     POCT Influenza A/B  -     POCT COVID-19, Antigen  -     loratadine (CLARITIN) 10 MG tablet; Take 1 tablet by mouth daily, Disp-30 tablet, R-0Normal  -     fluticasone (FLONASE) 50 MCG/ACT nasal spray; 2 sprays by Each Nostril route daily, Disp-1 Bottle, R-0Normal  2. Influenza A  -     loratadine (CLARITIN) 10 MG tablet; Take 1 tablet by mouth daily, Disp-30 tablet, R-0Normal  -     fluticasone (FLONASE) 50 MCG/ACT nasal spray; 2 sprays by Each Nostril route daily, Disp-1 Bottle, R-0Normal  -     benzonatate (TESSALON) 200 MG capsule; Take 1 capsule by mouth 3 times daily as needed for Cough, Disp-30 capsule, R-0Normal  3. Abscess, axilla  -     sulfamethoxazole-trimethoprim (BACTRIM DS;SEPTRA DS) 800-160 MG per tablet; Take 1 tablet by mouth 2 times daily for 7 days, Disp-14 tablet, R-0Normal      Return if symptoms worsen or fail to improve. Subjective   SUBJECTIVE/OBJECTIVE:  Patient presents for evaluation of cough rhinorrhea and nasal congestion. She has had the symptoms for about 5 days. Patient also has a lump in the right axilla that has been present for about a week. Patient states that she has had axillary abscesses in the past occasionally requiring I&D. She does not want an I&D today unless absolutely necessary. Review of Systems   Constitutional: Negative for chills, fatigue and fever. HENT: Positive for postnasal drip, rhinorrhea and sinus pressure. Negative for congestion and mouth sores. Eyes: Negative for photophobia, discharge and redness. Respiratory: Negative for cough and shortness of breath. Cardiovascular: Negative for chest pain. Genitourinary: Negative for difficulty urinating, dysuria, hematuria and urgency.    Skin:        Has a lump in the left axilla Neurological: Negative for headaches. Psychiatric/Behavioral: Negative for sleep disturbance. Objective   Physical Exam  Vitals and nursing note reviewed. Constitutional:       Appearance: Normal appearance. HENT:      Right Ear: Tympanic membrane and ear canal normal.      Left Ear: Tympanic membrane and ear canal normal.      Nose: Congestion and rhinorrhea present. Mouth/Throat:      Mouth: Mucous membranes are moist.      Pharynx: Oropharynx is clear. Eyes:      General: No scleral icterus. Conjunctiva/sclera: Conjunctivae normal.   Cardiovascular:      Rate and Rhythm: Normal rate and regular rhythm. Heart sounds: Normal heart sounds. Pulmonary:      Effort: Pulmonary effort is normal.      Breath sounds: Normal breath sounds. Musculoskeletal:      Cervical back: Neck supple. Skin:     General: Skin is warm. Comments: There is a tender lump but 2-1/2 cm in diameter in the right axilla. There is no discharge noted. Neurological:      Mental Status: She is alert. An electronic signature was used to authenticate this note. --Drake Smith.  Chanda Stark

## 2021-08-24 RX ORDER — ATORVASTATIN CALCIUM 40 MG/1
40 TABLET, FILM COATED ORAL DAILY
Qty: 30 TABLET | Refills: 0 | Status: SHIPPED
Start: 2021-08-24 | End: 2021-10-05 | Stop reason: SDUPTHER

## 2021-09-10 ENCOUNTER — OFFICE VISIT (OUTPATIENT)
Dept: PRIMARY CARE CLINIC | Age: 51
End: 2021-09-10
Payer: MEDICAID

## 2021-09-10 VITALS
DIASTOLIC BLOOD PRESSURE: 74 MMHG | TEMPERATURE: 97.9 F | BODY MASS INDEX: 44.33 KG/M2 | HEIGHT: 60 IN | HEART RATE: 83 BPM | WEIGHT: 225.8 LBS | SYSTOLIC BLOOD PRESSURE: 131 MMHG | RESPIRATION RATE: 20 BRPM | OXYGEN SATURATION: 100 %

## 2021-09-10 DIAGNOSIS — J01.00 ACUTE MAXILLARY SINUSITIS, RECURRENCE NOT SPECIFIED: Primary | ICD-10-CM

## 2021-09-10 PROCEDURE — G8428 CUR MEDS NOT DOCUMENT: HCPCS | Performed by: STUDENT IN AN ORGANIZED HEALTH CARE EDUCATION/TRAINING PROGRAM

## 2021-09-10 PROCEDURE — 3017F COLORECTAL CA SCREEN DOC REV: CPT | Performed by: STUDENT IN AN ORGANIZED HEALTH CARE EDUCATION/TRAINING PROGRAM

## 2021-09-10 PROCEDURE — 4004F PT TOBACCO SCREEN RCVD TLK: CPT | Performed by: STUDENT IN AN ORGANIZED HEALTH CARE EDUCATION/TRAINING PROGRAM

## 2021-09-10 PROCEDURE — G8417 CALC BMI ABV UP PARAM F/U: HCPCS | Performed by: STUDENT IN AN ORGANIZED HEALTH CARE EDUCATION/TRAINING PROGRAM

## 2021-09-10 PROCEDURE — 99213 OFFICE O/P EST LOW 20 MIN: CPT | Performed by: STUDENT IN AN ORGANIZED HEALTH CARE EDUCATION/TRAINING PROGRAM

## 2021-09-10 RX ORDER — AMOXICILLIN AND CLAVULANATE POTASSIUM 875; 125 MG/1; MG/1
1 TABLET, FILM COATED ORAL 2 TIMES DAILY
Qty: 14 TABLET | Refills: 0 | Status: SHIPPED | OUTPATIENT
Start: 2021-09-10 | End: 2021-09-17

## 2021-09-10 NOTE — PROGRESS NOTES
Patient:  Ileana Mojica 45 y.o. female     Date of Service: 9/10/21      Chiefcomplaint:   Chief Complaint   Patient presents with    Pharyngitis    Cough     dry cough     Nasal Congestion     nasal drainage      History of Present Illness   Patient presents to walk-in care with 1 week of nasal congestion, cough. She said that she recently had the flu and got over that but then developed a secondary infection. Denies history of asthma or COPD. Denies fever or muscle aches. Pertinent Medical, Family, Surgical, Social History:  Past Medical History:   Diagnosis Date    Arthritis     Blood transfusion     Bronchitis     Depression     Hypertension     Pneumonia     Type 2 diabetes mellitus ([facility] [address] 3/4/2020     Physical Exam   Vitals: /74 (Site: Left Upper Arm, Position: Sitting, Cuff Size: Large Adult)   Pulse 83   Temp 97.9 °F (36.6 °C) (Temporal)   Resp 20   Ht 5' (1.524 m)   Wt 225 lb 12.8 oz (102.4 kg)   LMP 07/27/2019   SpO2 100%   BMI 44.10 kg/m²   General Appearance: Alert, oriented, no acute distress  HEENT: Sinus pressure to palpation. Inflammation of nasal mucosa noted. Postnasal drip. No exudate in throat. Neck: No significant lymphadenopathy  Chest wall/Lung: Clear to auscultation bilaterally,  respirations unlabored. No ronchi/wheezing/rales  Heart: RRR, no murmur  Neuro: Alert and oriented        Psych: Appropriate mood and appropriate affect    Assessment and Plan   1. Acute maxillary sinusitis, recurrence not specified  Patient with likely secondary bacterial sinusitis after flu. We will treat with Augmentin. She has no antibiotic allergies  Supportive care with increase fluid intake, Tylenol as  needed. Return if no improvement after antibiotic therapy. .-     amoxicillin-clavulanate (AUGMENTIN) 875-125 MG per tablet; Take 1 tablet by mouth 2 times daily for 7 days, Disp-14 tablet, R-0Normal      No follow-ups on file.       Aline Linares, DO     This document may have been prepared at least partially through the use of voice recognition software. Although effort is taken to assure the accuracy of this document, it is possible that grammatical, syntax,  or spelling errors may occur.

## 2021-10-05 RX ORDER — ATORVASTATIN CALCIUM 40 MG/1
40 TABLET, FILM COATED ORAL DAILY
Qty: 30 TABLET | Refills: 3 | Status: SHIPPED
Start: 2021-10-05 | End: 2022-01-18

## 2021-11-02 RX ORDER — BACLOFEN 20 MG/1
TABLET ORAL
Qty: 60 TABLET | Refills: 4 | Status: SHIPPED
Start: 2021-11-02 | End: 2022-05-17

## 2021-12-17 DIAGNOSIS — E11.9 TYPE 2 DIABETES MELLITUS WITHOUT COMPLICATION, WITHOUT LONG-TERM CURRENT USE OF INSULIN (HCC): ICD-10-CM

## 2021-12-20 RX ORDER — FUROSEMIDE 20 MG/1
20 TABLET ORAL DAILY PRN
Qty: 30 TABLET | Refills: 5 | Status: SHIPPED | OUTPATIENT
Start: 2021-12-20

## 2021-12-20 RX ORDER — TRIAMTERENE AND HYDROCHLOROTHIAZIDE 37.5; 25 MG/1; MG/1
CAPSULE ORAL
Qty: 30 CAPSULE | Refills: 2 | Status: SHIPPED
Start: 2021-12-20 | End: 2022-03-22

## 2021-12-20 RX ORDER — GLIPIZIDE 5 MG/1
TABLET ORAL
Qty: 90 TABLET | Refills: 2 | Status: SHIPPED
Start: 2021-12-20 | End: 2022-03-22

## 2021-12-28 RX ORDER — KETOCONAZOLE 20 MG/ML
5 SHAMPOO TOPICAL
Qty: 120 ML | Refills: 2 | Status: SHIPPED | OUTPATIENT
Start: 2021-12-29

## 2022-03-03 RX ORDER — ATORVASTATIN CALCIUM 40 MG/1
TABLET, FILM COATED ORAL
Qty: 30 TABLET | Refills: 0 | Status: SHIPPED
Start: 2022-03-03 | End: 2022-04-19 | Stop reason: SDUPTHER

## 2022-03-03 NOTE — TELEPHONE ENCOUNTER
Patient needs to be seen per Dr. Mcintosh November. Please schedule patient for appointment.  Thank you

## 2022-03-18 DIAGNOSIS — E11.9 TYPE 2 DIABETES MELLITUS WITHOUT COMPLICATION, WITHOUT LONG-TERM CURRENT USE OF INSULIN (HCC): ICD-10-CM

## 2022-03-22 RX ORDER — GLIPIZIDE 5 MG/1
TABLET ORAL
Qty: 90 TABLET | Refills: 2 | Status: SHIPPED | OUTPATIENT
Start: 2022-03-22

## 2022-03-22 RX ORDER — TRIAMTERENE AND HYDROCHLOROTHIAZIDE 37.5; 25 MG/1; MG/1
CAPSULE ORAL
Qty: 30 CAPSULE | Refills: 2 | Status: SHIPPED | OUTPATIENT
Start: 2022-03-22

## 2022-04-19 RX ORDER — ATORVASTATIN CALCIUM 40 MG/1
TABLET, FILM COATED ORAL
Qty: 30 TABLET | Refills: 0 | Status: SHIPPED | OUTPATIENT
Start: 2022-04-19

## 2022-05-17 RX ORDER — BACLOFEN 20 MG/1
TABLET ORAL
Qty: 60 TABLET | Refills: 0 | Status: SHIPPED | OUTPATIENT
Start: 2022-05-17

## 2022-05-23 ENCOUNTER — TELEPHONE (OUTPATIENT)
Dept: FAMILY MEDICINE CLINIC | Age: 52
End: 2022-05-23

## 2022-05-23 NOTE — TELEPHONE ENCOUNTER
----- Message from Doreen Nolen sent at 5/23/2022  1:05 PM EDT -----  Subject: Appointment Request    Reason for Call: Routine Existing Condition Follow Up    QUESTIONS  Type of Appointment? New Patient/New to Provider  Reason for appointment request? No appointments available during search  Additional Information for Provider? NTP; Previous Pt of Dr Brittni Maldonado, Rs   missed visit 5/23 , Follow up, screen green, in person appt   ---------------------------------------------------------------------------  --------------  [id number]  What is the best way for the office to contact you? OK to leave message on   voicemail  Preferred Call Back Phone Number? [phone]  ---------------------------------------------------------------------------  --------------  SCRIPT ANSWERS  Relationship to Patient? Self  Is this follow up request related to routine Diabetes Management? No  Have you been diagnosed with, awaiting test results for, or told that you   are suspected of having COVID-19 (Coronavirus)? (If patient has tested   negative or was tested as a requirement for work, school, or travel and   not based on symptoms, answer no)? No  Within the past 10 days have you developed any of the following symptoms   (answer no if symptoms have been present longer than 10 days or began   more than 10 days ago)? Fever or Chills, Cough, Shortness of breath or   difficulty breathing, Loss of taste or smell, Sore throat, Nasal   congestion, Sneezing or runny nose, Fatigue or generalized body aches   (answer no if pain is specific to a body part e.g. back pain), Diarrhea,   Headache? No  Have you had close contact with someone with COVID-19 in the last 7 days? No  (Service Expert  click yes below to proceed with [facility] As Usual   Scheduling)?  Yes

## 2022-06-17 ENCOUNTER — TELEPHONE (OUTPATIENT)
Dept: FAMILY MEDICINE CLINIC | Age: 52
End: 2022-06-17

## 2022-06-17 NOTE — TELEPHONE ENCOUNTER
Pharmacy calling for refills on atorvastatin, baclofen, furosemide, gabapentin, glipizide, tramadol and triamterene-hydrochlorothiazide

## 2022-06-28 ENCOUNTER — TELEPHONE (OUTPATIENT)
Dept: FAMILY MEDICINE CLINIC | Age: 52
End: 2022-06-28

## 2022-06-28 DIAGNOSIS — E11.9 TYPE 2 DIABETES MELLITUS WITHOUT COMPLICATION, WITHOUT LONG-TERM CURRENT USE OF INSULIN (HCC): ICD-10-CM

## 2022-06-28 NOTE — TELEPHONE ENCOUNTER
Pt has not been seen for over a year, he was a No Show to establish w/Dr. Shemar Tomas twice, see if Dr. Shemar Tomas will still see pt, if so, he will need to schedule appointment within 1 month and will give enough refills only until appointment

## 2022-06-28 NOTE — TELEPHONE ENCOUNTER
Pharmacy called for refills for the pt, Atorvastatin, Baclofen, Furosemide, Glipizide, Keto Kontazal shampoo, Hydrochlorthiazide, send to [facility]

## 2022-06-29 RX ORDER — GLIPIZIDE 5 MG/1
TABLET ORAL
Qty: 90 TABLET | Refills: 2 | OUTPATIENT
Start: 2022-06-29

## 2022-06-29 RX ORDER — ATORVASTATIN CALCIUM 40 MG/1
TABLET, FILM COATED ORAL
Qty: 30 TABLET | Refills: 2 | OUTPATIENT
Start: 2022-06-29

## 2022-06-29 RX ORDER — FUROSEMIDE 20 MG/1
20 TABLET ORAL DAILY PRN
Qty: 30 TABLET | Refills: 5 | OUTPATIENT
Start: 2022-06-29

## 2022-06-29 RX ORDER — BACLOFEN 20 MG/1
TABLET ORAL
Qty: 60 TABLET | Refills: 0 | OUTPATIENT
Start: 2022-06-29

## 2022-06-29 RX ORDER — KETOCONAZOLE 20 MG/ML
5 SHAMPOO TOPICAL
Qty: 120 ML | Refills: 2 | OUTPATIENT
Start: 2022-06-29

## 2022-06-29 RX ORDER — TRIAMTERENE AND HYDROCHLOROTHIAZIDE 37.5; 25 MG/1; MG/1
CAPSULE ORAL
Qty: 30 CAPSULE | Refills: 2 | OUTPATIENT
Start: 2022-06-29

## 2022-07-01 ENCOUNTER — TELEPHONE (OUTPATIENT)
Dept: FAMILY MEDICINE CLINIC | Age: 52
End: 2022-07-01

## 2022-07-01 DIAGNOSIS — E11.9 TYPE 2 DIABETES MELLITUS WITHOUT COMPLICATION, WITHOUT LONG-TERM CURRENT USE OF INSULIN (HCC): ICD-10-CM

## 2022-07-01 RX ORDER — GLIPIZIDE 5 MG/1
TABLET ORAL
Qty: 90 TABLET | Refills: 2 | Status: CANCELLED | OUTPATIENT
Start: 2022-07-01

## 2022-07-01 NOTE — TELEPHONE ENCOUNTER
atorvastatin (LIPITOR) 40 MG tablet     furosemide (LASIX) 20 MG tablet     glipiZIDE (GLUCOTROL) 5 MG tablet     triamterene-hydroCHLOROthiazide (DYAZIDE) 37.5-25 MG per capsule     Thi from [facility] called for patient but the patient was a [facility] patient say doctor in January. She then made 2 appointments Dr. Humble Ware but canceled both appointments. I advised [facility] of this and that we may not refill the medications.

## 2022-07-06 RX ORDER — TRIAMTERENE AND HYDROCHLOROTHIAZIDE 37.5; 25 MG/1; MG/1
CAPSULE ORAL
Qty: 30 CAPSULE | Refills: 2 | OUTPATIENT
Start: 2022-07-06

## 2022-07-06 RX ORDER — FUROSEMIDE 20 MG/1
20 TABLET ORAL DAILY PRN
Qty: 30 TABLET | Refills: 5 | OUTPATIENT
Start: 2022-07-06

## 2022-07-06 RX ORDER — ATORVASTATIN CALCIUM 40 MG/1
TABLET, FILM COATED ORAL
Qty: 30 TABLET | Refills: 0 | OUTPATIENT
Start: 2022-07-06

## 2022-07-06 RX ORDER — GLIPIZIDE 5 MG/1
TABLET ORAL
Qty: 90 TABLET | Refills: 2 | OUTPATIENT
Start: 2022-07-06

## 2023-12-26 ENCOUNTER — OFFICE VISIT (OUTPATIENT)
Dept: FAMILY MEDICINE CLINIC | Age: 53
End: 2023-12-26
Payer: MEDICARE

## 2023-12-26 VITALS
TEMPERATURE: 97.2 F | WEIGHT: 226 LBS | BODY MASS INDEX: 44.37 KG/M2 | RESPIRATION RATE: 18 BRPM | OXYGEN SATURATION: 97 % | DIASTOLIC BLOOD PRESSURE: 74 MMHG | SYSTOLIC BLOOD PRESSURE: 126 MMHG | HEART RATE: 100 BPM | HEIGHT: 60 IN

## 2023-12-26 DIAGNOSIS — Z13.31 POSITIVE DEPRESSION SCREENING: ICD-10-CM

## 2023-12-26 DIAGNOSIS — E66.01 OBESITY, CLASS III, BMI 40-49.9 (MORBID OBESITY) (HCC): ICD-10-CM

## 2023-12-26 DIAGNOSIS — E78.5 HYPERLIPIDEMIA ASSOCIATED WITH TYPE 2 DIABETES MELLITUS (HCC): ICD-10-CM

## 2023-12-26 DIAGNOSIS — E11.69 HYPERLIPIDEMIA ASSOCIATED WITH TYPE 2 DIABETES MELLITUS (HCC): ICD-10-CM

## 2023-12-26 DIAGNOSIS — Z12.31 ENCOUNTER FOR SCREENING MAMMOGRAM FOR MALIGNANT NEOPLASM OF BREAST: ICD-10-CM

## 2023-12-26 DIAGNOSIS — E11.65 TYPE 2 DIABETES MELLITUS WITH HYPERGLYCEMIA, WITHOUT LONG-TERM CURRENT USE OF INSULIN (HCC): ICD-10-CM

## 2023-12-26 DIAGNOSIS — I10 PRIMARY HYPERTENSION: Chronic | ICD-10-CM

## 2023-12-26 DIAGNOSIS — Z87.891 PERSONAL HISTORY OF TOBACCO USE: ICD-10-CM

## 2023-12-26 DIAGNOSIS — Z12.11 COLON CANCER SCREENING: ICD-10-CM

## 2023-12-26 DIAGNOSIS — Z00.00 ENCOUNTER FOR MEDICAL EXAMINATION TO ESTABLISH CARE: Primary | ICD-10-CM

## 2023-12-26 DIAGNOSIS — Z72.0 TOBACCO ABUSE: ICD-10-CM

## 2023-12-26 DIAGNOSIS — F33.0 MILD EPISODE OF RECURRENT MAJOR DEPRESSIVE DISORDER (HCC): ICD-10-CM

## 2023-12-26 LAB
ABSOLUTE IMMATURE GRANULOCYTE: <0.03 K/UL (ref 0–0.58)
ALBUMIN SERPL-MCNC: 3.9 G/DL (ref 3.5–5.2)
ALP BLD-CCNC: 81 U/L (ref 35–104)
ALT SERPL-CCNC: 18 U/L (ref 0–32)
ANION GAP SERPL CALCULATED.3IONS-SCNC: 16 MMOL/L (ref 7–16)
AST SERPL-CCNC: 17 U/L (ref 0–31)
BASOPHILS ABSOLUTE: 0.04 K/UL (ref 0–0.2)
BASOPHILS RELATIVE PERCENT: 1 % (ref 0–2)
BILIRUB SERPL-MCNC: 0.4 MG/DL (ref 0–1.2)
BUN BLDV-MCNC: 15 MG/DL (ref 6–20)
CALCIUM SERPL-MCNC: 9.3 MG/DL (ref 8.6–10.2)
CHLORIDE BLD-SCNC: 95 MMOL/L (ref 98–107)
CHOLESTEROL: 200 MG/DL
CO2: 20 MMOL/L (ref 22–29)
CREAT SERPL-MCNC: 0.5 MG/DL (ref 0.5–1)
EOSINOPHILS ABSOLUTE: 0.16 K/UL (ref 0.05–0.5)
EOSINOPHILS RELATIVE PERCENT: 2 % (ref 0–6)
GFR SERPL CREATININE-BSD FRML MDRD: >60 ML/MIN/1.73M2
GLUCOSE BLD-MCNC: 362 MG/DL (ref 74–99)
HBA1C MFR BLD: 10.3 %
HCT VFR BLD CALC: 38.3 % (ref 34–48)
HDLC SERPL-MCNC: 68 MG/DL
HEMOGLOBIN: 12.3 G/DL (ref 11.5–15.5)
IMMATURE GRANULOCYTES: 0 % (ref 0–5)
LDL CHOLESTEROL: 118 MG/DL
LYMPHOCYTES ABSOLUTE: 2.12 K/UL (ref 1.5–4)
LYMPHOCYTES RELATIVE PERCENT: 29 % (ref 20–42)
MCH RBC QN AUTO: 29.6 PG (ref 26–35)
MCHC RBC AUTO-ENTMCNC: 32.1 G/DL (ref 32–34.5)
MCV RBC AUTO: 92.3 FL (ref 80–99.9)
MONOCYTES ABSOLUTE: 0.49 K/UL (ref 0.1–0.95)
MONOCYTES RELATIVE PERCENT: 7 % (ref 2–12)
NEUTROPHILS ABSOLUTE: 4.56 K/UL (ref 1.8–7.3)
NEUTROPHILS RELATIVE PERCENT: 62 % (ref 43–80)
PDW BLD-RTO: 12.5 % (ref 11.5–15)
PLATELET # BLD: 229 K/UL (ref 130–450)
PMV BLD AUTO: 12.5 FL (ref 7–12)
POTASSIUM SERPL-SCNC: 4.8 MMOL/L (ref 3.5–5)
RBC # BLD: 4.15 M/UL (ref 3.5–5.5)
SODIUM BLD-SCNC: 131 MMOL/L (ref 132–146)
TOTAL PROTEIN: 8.1 G/DL (ref 6.4–8.3)
TRIGL SERPL-MCNC: 72 MG/DL
TSH SERPL DL<=0.05 MIU/L-ACNC: 0.91 UIU/ML (ref 0.27–4.2)
VLDLC SERPL CALC-MCNC: 14 MG/DL
WBC # BLD: 7.4 K/UL (ref 4.5–11.5)

## 2023-12-26 PROCEDURE — 82044 UR ALBUMIN SEMIQUANTITATIVE: CPT | Performed by: FAMILY MEDICINE

## 2023-12-26 PROCEDURE — G8484 FLU IMMUNIZE NO ADMIN: HCPCS | Performed by: FAMILY MEDICINE

## 2023-12-26 PROCEDURE — 99214 OFFICE O/P EST MOD 30 MIN: CPT | Performed by: FAMILY MEDICINE

## 2023-12-26 PROCEDURE — G8427 DOCREV CUR MEDS BY ELIG CLIN: HCPCS | Performed by: FAMILY MEDICINE

## 2023-12-26 PROCEDURE — 3046F HEMOGLOBIN A1C LEVEL >9.0%: CPT | Performed by: FAMILY MEDICINE

## 2023-12-26 PROCEDURE — 4004F PT TOBACCO SCREEN RCVD TLK: CPT | Performed by: FAMILY MEDICINE

## 2023-12-26 PROCEDURE — 3017F COLORECTAL CA SCREEN DOC REV: CPT | Performed by: FAMILY MEDICINE

## 2023-12-26 PROCEDURE — G0296 VISIT TO DETERM LDCT ELIG: HCPCS | Performed by: FAMILY MEDICINE

## 2023-12-26 PROCEDURE — 83036 HEMOGLOBIN GLYCOSYLATED A1C: CPT | Performed by: FAMILY MEDICINE

## 2023-12-26 PROCEDURE — 2022F DILAT RTA XM EVC RTNOPTHY: CPT | Performed by: FAMILY MEDICINE

## 2023-12-26 PROCEDURE — 3074F SYST BP LT 130 MM HG: CPT | Performed by: FAMILY MEDICINE

## 2023-12-26 PROCEDURE — 36415 COLL VENOUS BLD VENIPUNCTURE: CPT | Performed by: FAMILY MEDICINE

## 2023-12-26 PROCEDURE — G8417 CALC BMI ABV UP PARAM F/U: HCPCS | Performed by: FAMILY MEDICINE

## 2023-12-26 PROCEDURE — 3078F DIAST BP <80 MM HG: CPT | Performed by: FAMILY MEDICINE

## 2023-12-26 RX ORDER — ATORVASTATIN CALCIUM 40 MG/1
TABLET, FILM COATED ORAL
Qty: 30 TABLET | Refills: 3 | Status: SHIPPED | OUTPATIENT
Start: 2023-12-26

## 2023-12-26 RX ORDER — ALBUTEROL SULFATE 90 UG/1
2 AEROSOL, METERED RESPIRATORY (INHALATION) EVERY 6 HOURS PRN
Qty: 18 G | Refills: 3 | Status: SHIPPED | OUTPATIENT
Start: 2023-12-26

## 2023-12-26 RX ORDER — GLIPIZIDE 5 MG/1
5 TABLET ORAL
Qty: 60 TABLET | Refills: 2 | Status: SHIPPED | OUTPATIENT
Start: 2023-12-26

## 2023-12-26 NOTE — PROGRESS NOTES
to their illness. Cessation encouraged. Picking a quit date was encouraged. Hyperlipidemia associated with type 2 diabetes mellitus (HCC)  -     Comprehensive Metabolic Panel  -     Lipid Panel  -     atorvastatin (LIPITOR) 40 MG tablet; TAKE ONE TABLET BY MOUTH EVERY DAY  Labs ordered  Diet and exercise were discussed and recommended to the patient. Obesity, Class III, BMI 40-49.9 (morbid obesity) (HCC)  Diet and exercise were discussed and recommended to the patient. Positive depression screening  Depression is not well controlled  Will restart zoloft  Side effects reviewed . Mild episode of recurrent major depressive disorder (HCC)  -     sertraline (ZOLOFT) 50 MG tablet; Take 1 tablet by mouth daily  Will restart zoloft  Side effects reviewed. Encounter for screening mammogram for malignant neoplasm of breast  -     AGUEDA DIGITAL SCREEN BILATERAL PER PROTOCOL; Future    Colon cancer screening  -     POCT Fecal Immunochemical Test (FIT); Future    Personal history of tobacco use  -     IN VISIT TO DISCUSS LUNG CA SCREEN W LDCT  -     CT Lung Screen (Initial/Annual/Baseline); Future    Other orders  -     albuterol sulfate HFA (PROVENTIL;VENTOLIN;PROAIR) 108 (90 Base) MCG/ACT inhaler; Inhale 2 puffs into the lungs every 6 hours as needed for Wheezing or Shortness of Breath      As above. Call or go to ED immediately if symptoms worsen or persist.  Return in about 4 weeks (around 1/23/2024) for anxiety, dm, needs AWV., or sooner if necessary. Educational materials and/or home exercises printed for patient's review and were included in patient instructions on his/her After Visit Summary and given to patient at the end of visit. Counseled regarding above diagnosis, including possible risks and complications,  especially if left uncontrolled.     Counseled regarding the possible side effects, risks, benefits and alternatives to treatment; patient and/or guardian verbalizes understanding,

## 2024-02-13 DIAGNOSIS — E11.65 TYPE 2 DIABETES MELLITUS WITH HYPERGLYCEMIA, WITHOUT LONG-TERM CURRENT USE OF INSULIN (HCC): ICD-10-CM

## 2024-02-13 RX ORDER — GLIPIZIDE 5 MG/1
TABLET ORAL
Qty: 60 TABLET | Refills: 2 | Status: SHIPPED | OUTPATIENT
Start: 2024-02-13